# Patient Record
Sex: MALE | Race: WHITE | NOT HISPANIC OR LATINO | URBAN - NONMETROPOLITAN AREA
[De-identification: names, ages, dates, MRNs, and addresses within clinical notes are randomized per-mention and may not be internally consistent; named-entity substitution may affect disease eponyms.]

---

## 2018-10-03 ENCOUNTER — FLU SHOT (OUTPATIENT)
Dept: FAMILY MEDICINE CLINIC | Facility: CLINIC | Age: 43
End: 2018-10-03

## 2018-10-03 DIAGNOSIS — Z23 FLU VACCINE NEED: Primary | ICD-10-CM

## 2018-10-03 PROCEDURE — 90471 IMMUNIZATION ADMIN: CPT | Performed by: FAMILY MEDICINE

## 2018-10-03 PROCEDURE — 90674 CCIIV4 VAC NO PRSV 0.5 ML IM: CPT | Performed by: FAMILY MEDICINE

## 2021-06-27 PROCEDURE — 87635 SARS-COV-2 COVID-19 AMP PRB: CPT | Performed by: NURSE PRACTITIONER

## 2022-11-10 ENCOUNTER — OFFICE VISIT (OUTPATIENT)
Dept: FAMILY MEDICINE CLINIC | Facility: CLINIC | Age: 47
End: 2022-11-10

## 2022-11-10 ENCOUNTER — LAB (OUTPATIENT)
Dept: LAB | Facility: HOSPITAL | Age: 47
End: 2022-11-10

## 2022-11-10 VITALS
DIASTOLIC BLOOD PRESSURE: 80 MMHG | TEMPERATURE: 96.9 F | SYSTOLIC BLOOD PRESSURE: 110 MMHG | OXYGEN SATURATION: 98 % | BODY MASS INDEX: 32.11 KG/M2 | RESPIRATION RATE: 18 BRPM | WEIGHT: 242.3 LBS | HEIGHT: 73 IN | HEART RATE: 93 BPM

## 2022-11-10 DIAGNOSIS — Z12.5 PROSTATE CANCER SCREENING: ICD-10-CM

## 2022-11-10 DIAGNOSIS — R53.83 OTHER FATIGUE: ICD-10-CM

## 2022-11-10 DIAGNOSIS — Z12.11 SCREENING FOR COLON CANCER: ICD-10-CM

## 2022-11-10 DIAGNOSIS — Z80.0 FAMILY HISTORY OF COLON CANCER: ICD-10-CM

## 2022-11-10 DIAGNOSIS — Z11.59 NEED FOR HEPATITIS C SCREENING TEST: ICD-10-CM

## 2022-11-10 DIAGNOSIS — Z76.89 ENCOUNTER TO ESTABLISH CARE: Primary | ICD-10-CM

## 2022-11-10 DIAGNOSIS — Z00.00 ANNUAL PHYSICAL EXAM: ICD-10-CM

## 2022-11-10 LAB
ALBUMIN SERPL-MCNC: 4.6 G/DL (ref 3.5–5.2)
ALBUMIN/GLOB SERPL: 2 G/DL
ALP SERPL-CCNC: 73 U/L (ref 39–117)
ALT SERPL W P-5'-P-CCNC: 54 U/L (ref 1–41)
ANION GAP SERPL CALCULATED.3IONS-SCNC: 11 MMOL/L (ref 5–15)
AST SERPL-CCNC: 36 U/L (ref 1–40)
BILIRUB SERPL-MCNC: 0.8 MG/DL (ref 0–1.2)
BUN SERPL-MCNC: 11 MG/DL (ref 6–20)
BUN/CREAT SERPL: 13.9 (ref 7–25)
CALCIUM SPEC-SCNC: 9.3 MG/DL (ref 8.6–10.5)
CHLORIDE SERPL-SCNC: 103 MMOL/L (ref 98–107)
CO2 SERPL-SCNC: 25 MMOL/L (ref 22–29)
CREAT SERPL-MCNC: 0.79 MG/DL (ref 0.76–1.27)
EGFRCR SERPLBLD CKD-EPI 2021: 111.6 ML/MIN/1.73
GLOBULIN UR ELPH-MCNC: 2.3 GM/DL
GLUCOSE SERPL-MCNC: 315 MG/DL (ref 65–99)
HCV AB SER DONR QL: NORMAL
HOLD SPECIMEN: NORMAL
POTASSIUM SERPL-SCNC: 4.3 MMOL/L (ref 3.5–5.2)
PROT SERPL-MCNC: 6.9 G/DL (ref 6–8.5)
SODIUM SERPL-SCNC: 139 MMOL/L (ref 136–145)

## 2022-11-10 PROCEDURE — 99213 OFFICE O/P EST LOW 20 MIN: CPT | Performed by: NURSE PRACTITIONER

## 2022-11-10 PROCEDURE — 83036 HEMOGLOBIN GLYCOSYLATED A1C: CPT

## 2022-11-10 PROCEDURE — 80061 LIPID PANEL: CPT

## 2022-11-10 PROCEDURE — G0103 PSA SCREENING: HCPCS

## 2022-11-10 PROCEDURE — 86803 HEPATITIS C AB TEST: CPT

## 2022-11-10 PROCEDURE — 80050 GENERAL HEALTH PANEL: CPT

## 2022-11-10 PROCEDURE — 36415 COLL VENOUS BLD VENIPUNCTURE: CPT

## 2022-11-10 NOTE — PROGRESS NOTES
Subjective   Erich Pompa is a 46 y.o. male.     History of Present Illness  CC: Establish care/annual exam- fatigue after eating  Fatigue  This is a new problem. The current episode started more than 1 month ago. The problem occurs intermittently. The problem has been waxing and waning. Associated symptoms include fatigue. Pertinent negatives include no abdominal pain, chest pain, chills, congestion, coughing, diaphoresis, fever, myalgias, nausea, rash, sore throat, vomiting or weakness. The symptoms are aggravated by eating. He has tried eating (lower carb diet) for the symptoms. The treatment provided mild relief.        The following portions of the patient's history were reviewed and updated as appropriate: allergies, current medications, past family history, past medical history, past social history, past surgical history and problem list.    Review of Systems   Constitutional: Positive for fatigue. Negative for activity change, appetite change, chills, diaphoresis, fever, unexpected weight gain and unexpected weight loss.   HENT: Negative for congestion, sore throat, trouble swallowing and voice change.    Eyes: Negative for blurred vision, double vision, photophobia, pain and visual disturbance.   Respiratory: Negative for cough, chest tightness, shortness of breath and wheezing.    Cardiovascular: Negative for chest pain, palpitations and leg swelling.   Gastrointestinal: Negative for abdominal distention, abdominal pain, anal bleeding, blood in stool, constipation, diarrhea, nausea, vomiting, GERD and indigestion.   Endocrine: Negative for cold intolerance, heat intolerance, polydipsia, polyphagia and polyuria.   Genitourinary: Negative for dysuria, hematuria and urgency.   Musculoskeletal: Negative for back pain and myalgias.   Skin: Negative for rash.   Allergic/Immunologic: Negative.    Neurological: Negative for dizziness, syncope, weakness, light-headedness and headache.   Hematological: Negative.     Psychiatric/Behavioral: Negative for depressed mood.       Objective   Physical Exam  Vitals and nursing note reviewed.   Constitutional:       General: He is not in acute distress.     Appearance: Normal appearance. He is well-developed. He is obese. He is not ill-appearing, toxic-appearing or diaphoretic.   HENT:      Head: Normocephalic and atraumatic.      Right Ear: External ear normal.      Left Ear: External ear normal.      Nose: Nose normal.   Eyes:      Conjunctiva/sclera: Conjunctivae normal.      Pupils: Pupils are equal, round, and reactive to light.   Neck:      Thyroid: No thyromegaly.      Vascular: No carotid bruit.      Trachea: No tracheal deviation.   Cardiovascular:      Rate and Rhythm: Normal rate and regular rhythm.      Heart sounds: Normal heart sounds. No murmur heard.    No friction rub. No gallop.   Pulmonary:      Effort: Pulmonary effort is normal. No respiratory distress.      Breath sounds: Normal breath sounds. No stridor. No wheezing, rhonchi or rales.   Abdominal:      General: Bowel sounds are normal. There is no distension.      Palpations: Abdomen is soft. There is no mass.      Tenderness: There is no abdominal tenderness. There is no guarding or rebound.      Hernia: No hernia is present.   Musculoskeletal:         General: No tenderness. Normal range of motion.      Cervical back: Normal range of motion and neck supple.   Lymphadenopathy:      Cervical: No cervical adenopathy.   Skin:     General: Skin is warm and dry.      Coloration: Skin is not pale.      Findings: No erythema or rash.   Neurological:      Mental Status: He is alert and oriented to person, place, and time.      Cranial Nerves: No cranial nerve deficit.      Coordination: Coordination normal.   Psychiatric:         Mood and Affect: Mood normal.         Behavior: Behavior normal.         Thought Content: Thought content normal.         Judgment: Judgment normal.           Assessment & Plan   Diagnoses and  all orders for this visit:    1. Encounter to establish care (Primary)    2. Annual physical exam  -     CBC & Differential; Future  -     Comprehensive Metabolic Panel; Future  -     Hemoglobin A1c; Future  -     Lipid Panel; Future  -     TSH; Future  -     PSA Screen; Future  -     Hepatitis C Antibody; Future, will call with results    3. Prostate cancer screening  -     PSA Screen; Future, will call with results    4. Need for hepatitis C screening test  -     Hepatitis C Antibody; Future, will call with results    5. Screening for colon cancer   -Colonoscopy up-to-date as of 2019 per patient.  Next colonoscopy due 2024.  Patient asymptomatic.  We will continue to monitor.    6. Family history of colon cancer   -Plan of care stated above #5.  We will continue to monitor.    7. Other fatigue  Comments:  after eating    Orders:  -     CBC & Differential; Future  -     Comprehensive Metabolic Panel; Future  -     Hemoglobin A1c; Future  -     TSH; Future, will call with results.  Continue low-carb diet.  We will continue to monitor.    8.  Follow-up in 1 year for routine annual physical or sooner for any acute needs.            This document has been electronically signed by DAVY Ash on November 10, 2022 09:32 CST

## 2022-11-11 DIAGNOSIS — E11.9 TYPE 2 DIABETES MELLITUS WITHOUT COMPLICATION, WITHOUT LONG-TERM CURRENT USE OF INSULIN: Primary | ICD-10-CM

## 2022-11-11 LAB
BASOPHILS # BLD AUTO: 0.05 10*3/MM3 (ref 0–0.2)
BASOPHILS NFR BLD AUTO: 0.9 % (ref 0–1.5)
CHOLEST SERPL-MCNC: 197 MG/DL (ref 0–200)
DEPRECATED RDW RBC AUTO: 40.9 FL (ref 37–54)
EOSINOPHIL # BLD AUTO: 0.12 10*3/MM3 (ref 0–0.4)
EOSINOPHIL NFR BLD AUTO: 2.2 % (ref 0.3–6.2)
ERYTHROCYTE [DISTWIDTH] IN BLOOD BY AUTOMATED COUNT: 12.5 % (ref 12.3–15.4)
HBA1C MFR BLD: 11.1 % (ref 4.8–5.6)
HCT VFR BLD AUTO: 45.5 % (ref 37.5–51)
HDLC SERPL-MCNC: 48 MG/DL (ref 40–60)
HGB BLD-MCNC: 15.6 G/DL (ref 13–17.7)
IMM GRANULOCYTES # BLD AUTO: 0.03 10*3/MM3 (ref 0–0.05)
IMM GRANULOCYTES NFR BLD AUTO: 0.5 % (ref 0–0.5)
LDLC SERPL CALC-MCNC: 128 MG/DL (ref 0–100)
LDLC/HDLC SERPL: 2.62 {RATIO}
LYMPHOCYTES # BLD AUTO: 1.44 10*3/MM3 (ref 0.7–3.1)
LYMPHOCYTES NFR BLD AUTO: 26.1 % (ref 19.6–45.3)
MCH RBC QN AUTO: 30.3 PG (ref 26.6–33)
MCHC RBC AUTO-ENTMCNC: 34.3 G/DL (ref 31.5–35.7)
MCV RBC AUTO: 88.3 FL (ref 79–97)
MONOCYTES # BLD AUTO: 0.35 10*3/MM3 (ref 0.1–0.9)
MONOCYTES NFR BLD AUTO: 6.3 % (ref 5–12)
NEUTROPHILS NFR BLD AUTO: 3.53 10*3/MM3 (ref 1.7–7)
NEUTROPHILS NFR BLD AUTO: 64 % (ref 42.7–76)
NRBC BLD AUTO-RTO: 0 /100 WBC (ref 0–0.2)
PLATELET # BLD AUTO: 194 10*3/MM3 (ref 140–450)
PMV BLD AUTO: 11.8 FL (ref 6–12)
PSA SERPL-MCNC: 0.4 NG/ML (ref 0–4)
RBC # BLD AUTO: 5.15 10*6/MM3 (ref 4.14–5.8)
TRIGL SERPL-MCNC: 116 MG/DL (ref 0–150)
TSH SERPL DL<=0.05 MIU/L-ACNC: 1.37 UIU/ML (ref 0.27–4.2)
VLDLC SERPL-MCNC: 21 MG/DL (ref 5–40)
WBC NRBC COR # BLD: 5.52 10*3/MM3 (ref 3.4–10.8)

## 2022-11-11 RX ORDER — METFORMIN HYDROCHLORIDE 500 MG/1
500 TABLET, EXTENDED RELEASE ORAL 2 TIMES DAILY WITH MEALS
Qty: 180 TABLET | Refills: 3 | Status: SHIPPED | OUTPATIENT
Start: 2022-11-11

## 2022-11-11 RX ORDER — BLOOD-GLUCOSE METER
1 KIT MISCELLANEOUS 2 TIMES DAILY
Qty: 1 EACH | Refills: 0 | Status: SHIPPED | OUTPATIENT
Start: 2022-11-11

## 2022-11-11 NOTE — PROGRESS NOTES
Patient's hemoglobin A1c elevated at diabetic level, 11.1.  I am going to start him on a low-dose of metformin 500 mg twice daily with meals.  Strict diabetic diet.  Monitor glucose twice daily.  Prescription for glucometer sent to pharmacy.  Referral to diabetic educator placed.  I would like to see him in 1 month to see how he is tolerating his medication as further adjustments will likely be needed.  1 month follow-up please

## 2022-11-17 ENCOUNTER — OFFICE VISIT (OUTPATIENT)
Dept: ENDOCRINOLOGY | Facility: CLINIC | Age: 47
End: 2022-11-17

## 2022-11-17 DIAGNOSIS — E11.9 TYPE 2 DIABETES MELLITUS WITHOUT COMPLICATION, WITHOUT LONG-TERM CURRENT USE OF INSULIN: ICD-10-CM

## 2022-11-17 PROCEDURE — 98960 EDU&TRN PT SELF-MGMT NQHP 1: CPT | Performed by: DIETITIAN, REGISTERED

## 2022-11-17 NOTE — PROGRESS NOTES
Erich Pompa is a 46 y.o. male referred for outpatient diabetes education by Norris BHATTI. Patient attended individual education for 1 hour with wife on 11/17/2022. Topics covered included:     1. Healthy Food Choices   i. Provided patient with detailed carbohydrate counting guide.    ii. Instructed patient to eat 45-60 grams of carbohydrate with each meal (3-4 exchange choices) and 15 grams of carb for snacks (1 exchange choices).   iii. Provided patient with list of non-starchy vegetables and foods that are low in carbohydrate for snacks and to incorporate with meals (Planning Healthy Meals Packet).    iv. Choose fruits, vegetables, whole grains, legumes, low-fat milk, fiber-rich foods, minimal saturated fats, and watch cholesterol and sodium intake.    v. Reviewed carbohydrate-containing foods, standard serving sizes, and measuring foods.   vi. Reviewed the difference between simple and complex carbohydrate. Encouraged patient to choose complex carbohydrates more often.   vii. Reviewed label reading. Discussed looking at serving size, total carbohydrates, fiber, saturated fat, sodium. Reviewed  amounts of each to hav per day & per meal.      2. Pathophysiology of Diabetes   i. Explained pre-diabetes and diabetes. Reviewed disease process of type 2 diabetes.    ii. Explained common conditions associated with uncontrolled diabetes (diabetic neuropathy, retinopathy, nephropathy, heart disease, skin infections, and kidney disease)     3. Hyperglycemia/Hypoglycemia   i. Discussed signs, symptoms, and difference between hypo/hyperglycemia - and the proper treatment guidelines for both.    ii. Explained A1C and the average blood sugar that goes along with the A1C level.    iii. Discussed how to check blood sugar. Stated when checking blood sugar to check different times of the day to see if there is a pattern. For example: Check fasting blood glucose in the morning, check before lunch, check 2 hours after a  meal, and at bedtime. Reminded to check blood sugar if ever feels jittery to know if blood sugar is high or low.     Provided patient with Diabetes and You book, and Planning Healthy Meals book. Provided handout of sample menu with carbs listed.      Thank you Norris BHATTI for this referral.      JAVIER Arreola, MARIO, LD

## 2022-12-15 ENCOUNTER — OFFICE VISIT (OUTPATIENT)
Dept: FAMILY MEDICINE CLINIC | Facility: CLINIC | Age: 47
End: 2022-12-15

## 2022-12-15 VITALS
RESPIRATION RATE: 18 BRPM | HEIGHT: 73 IN | SYSTOLIC BLOOD PRESSURE: 118 MMHG | OXYGEN SATURATION: 96 % | TEMPERATURE: 97.3 F | BODY MASS INDEX: 31.6 KG/M2 | DIASTOLIC BLOOD PRESSURE: 86 MMHG | WEIGHT: 238.4 LBS | HEART RATE: 80 BPM

## 2022-12-15 DIAGNOSIS — E78.00 ELEVATED LDL CHOLESTEROL LEVEL: ICD-10-CM

## 2022-12-15 DIAGNOSIS — E11.9 TYPE 2 DIABETES MELLITUS WITHOUT COMPLICATION, WITHOUT LONG-TERM CURRENT USE OF INSULIN: Primary | ICD-10-CM

## 2022-12-15 PROCEDURE — 99214 OFFICE O/P EST MOD 30 MIN: CPT | Performed by: NURSE PRACTITIONER

## 2022-12-15 RX ORDER — PRAVASTATIN SODIUM 20 MG
20 TABLET ORAL NIGHTLY
Qty: 30 TABLET | Refills: 5 | Status: SHIPPED | OUTPATIENT
Start: 2022-12-15

## 2022-12-15 RX ORDER — LANCETS 28 GAUGE
EACH MISCELLANEOUS
COMMUNITY
Start: 2022-11-11 | End: 2023-03-16 | Stop reason: SDUPTHER

## 2022-12-15 NOTE — PROGRESS NOTES
Subjective   Erich Pompa is a 46 y.o. male.     History of Present Illness  CC: Follow-up-diabetes, hyperlipidemia  Diabetes  He has type 2 diabetes mellitus. No MedicAlert identification noted. The initial diagnosis of diabetes was made 1 Months ago. Hypoglycemia symptoms include headaches. Pertinent negatives for hypoglycemia include no confusion, dizziness, hunger, mood changes, nervousness/anxiousness, pallor, seizures, sleepiness, speech difficulty, sweats or tremors. Associated symptoms include foot paresthesias. Pertinent negatives for diabetes include no blurred vision, no chest pain, no fatigue, no foot ulcerations, no polydipsia, no polyphagia, no polyuria, no visual change, no weakness and no weight loss. Pertinent negatives for hypoglycemia complications include no blackouts, no hospitalization, no nocturnal hypoglycemia, no required assistance and no required glucagon injection. Symptoms are improving. Diabetic complications include impotence and PVD. Pertinent negatives for diabetic complications include no CVA, heart disease, nephropathy, peripheral neuropathy or retinopathy. Risk factors for coronary artery disease include family history, obesity and sedentary lifestyle. Current diabetic treatment includes diet and oral agent (monotherapy). He is compliant with treatment all of the time. He is currently taking insulin pre-breakfast and at bedtime. His weight is stable. He is following a diabetic and low fiber diet. Meal planning includes avoidance of concentrated sweets and carbohydrate counting. He has had a previous visit with a dietitian. He participates in exercise daily. He monitors blood glucose at home 1-2 x per day. He monitors urine at home <1 x per month. Blood glucose monitoring compliance is excellent. His home blood glucose trend is fluctuating minimally. His breakfast blood glucose is taken after 10 am. His breakfast blood glucose range is generally 140-180 mg/dl. His dinner blood  glucose is taken after 8 pm. His dinner blood glucose range is generally 140-180 mg/dl. His overall blood glucose range is 140-180 mg/dl. He does not see a podiatrist.Eye exam is current.   Hyperlipidemia  This is a new problem. The current episode started more than 1 year ago. Recent lipid tests were reviewed and are variable. Exacerbating diseases include diabetes and obesity. Factors aggravating his hyperlipidemia include fatty foods. Pertinent negatives include no chest pain, focal weakness, leg pain, myalgias or shortness of breath. Current antihyperlipidemic treatment includes statins. There are no compliance problems.  Risk factors for coronary artery disease include family history, dyslipidemia, diabetes mellitus, male sex and obesity.        The following portions of the patient's history were reviewed and updated as appropriate: allergies, current medications, past family history, past medical history, past social history, past surgical history and problem list.    Review of Systems   Constitutional: Negative for activity change, appetite change, chills, fatigue, fever, unexpected weight gain and unexpected weight loss.   HENT: Negative for congestion, sore throat, trouble swallowing and voice change.    Eyes: Negative.  Negative for blurred vision.   Respiratory: Negative for cough, chest tightness, shortness of breath and wheezing.    Cardiovascular: Negative for chest pain, palpitations and leg swelling.   Gastrointestinal: Negative for abdominal pain, diarrhea, nausea and vomiting.   Endocrine: Negative.  Negative for cold intolerance, heat intolerance, polydipsia, polyphagia and polyuria.   Genitourinary: Positive for impotence. Negative for dysuria, hematuria and urgency.   Musculoskeletal: Negative for arthralgias and myalgias.   Skin: Negative for pallor and rash.   Neurological: Negative for dizziness, tremors, focal weakness, seizures, speech difficulty, weakness, light-headedness, headache and  confusion.   Hematological: Negative.    Psychiatric/Behavioral: The patient is not nervous/anxious.        Objective   Physical Exam  Vitals and nursing note reviewed.   Constitutional:       General: He is not in acute distress.     Appearance: Normal appearance. He is well-developed. He is obese. He is not ill-appearing, toxic-appearing or diaphoretic.   HENT:      Head: Normocephalic and atraumatic.   Eyes:      Conjunctiva/sclera: Conjunctivae normal.   Cardiovascular:      Rate and Rhythm: Normal rate and regular rhythm.      Heart sounds: Normal heart sounds. No murmur heard.    No friction rub. No gallop.   Pulmonary:      Effort: Pulmonary effort is normal. No respiratory distress.      Breath sounds: Normal breath sounds. No stridor. No wheezing, rhonchi or rales.   Abdominal:      General: Bowel sounds are normal. There is no distension.      Palpations: Abdomen is soft. There is no mass.      Tenderness: There is no abdominal tenderness. There is no guarding or rebound.      Hernia: No hernia is present.   Musculoskeletal:         General: No tenderness. Normal range of motion.      Cervical back: Normal range of motion.   Skin:     General: Skin is warm and dry.      Coloration: Skin is not pale.      Findings: No erythema or rash.   Neurological:      Mental Status: He is alert and oriented to person, place, and time.   Psychiatric:         Mood and Affect: Mood normal.         Behavior: Behavior normal.         Thought Content: Thought content normal.         Judgment: Judgment normal.           Assessment & Plan   Diagnoses and all orders for this visit:    1. Type 2 diabetes mellitus without complication, without long-term current use of insulin (HCC) (Primary)  -     empagliflozin (Jardiance) 10 MG tablet tablet; Take 1 tablet by mouth Daily.  Dispense: 30 tablet; Refill: 5   -Patient has seen diabetic educator and has started to make dietary changes.  Tolerating metformin well with no adverse GI  effects reported.  Continue metformin and strict diabetic diet.  We will add Jardiance 10 mg daily.  Current glucose levels averaging between 130 and 180 since changing diet and starting metformin.  Spent approximately 20 minutes with patient discussing diabetic management, diet, monitoring.  All questions answered to patient's satisfaction.  Patient verbalized understanding of instruction.  We will continue to monitor.    2. Elevated LDL cholesterol level  -     pravastatin (PRAVACHOL) 20 MG tablet; Take 1 tablet by mouth Every Night.  Dispense: 30 tablet; Refill: 5   - Mildly elevated LDL above goal.  We will start pravastatin 20 mg daily.  Low-fat diet.  We will continue to monitor.    3.  Follow-up in 3 months or sooner for any acute needs.            This document has been electronically signed by DAVY Ash on December 15, 2022 13:24 CST

## 2022-12-29 DIAGNOSIS — Z12.11 SCREENING FOR COLON CANCER: Primary | ICD-10-CM

## 2023-01-11 ENCOUNTER — PREP FOR SURGERY (OUTPATIENT)
Dept: OTHER | Facility: HOSPITAL | Age: 48
End: 2023-01-11
Payer: COMMERCIAL

## 2023-01-11 DIAGNOSIS — Z80.0 FAMILY HISTORY OF MALIGNANT NEOPLASM OF GASTROINTESTINAL TRACT: ICD-10-CM

## 2023-01-11 DIAGNOSIS — Z12.11 ENCOUNTER FOR SCREENING FOR MALIGNANT NEOPLASM OF COLON: Primary | ICD-10-CM

## 2023-01-11 RX ORDER — DEXTROSE AND SODIUM CHLORIDE 5; .45 G/100ML; G/100ML
30 INJECTION, SOLUTION INTRAVENOUS CONTINUOUS PRN
Status: CANCELLED | OUTPATIENT
Start: 2023-02-09

## 2023-01-11 RX ORDER — SODIUM CHLORIDE 9 MG/ML
40 INJECTION, SOLUTION INTRAVENOUS AS NEEDED
Status: CANCELLED | OUTPATIENT
Start: 2023-02-09

## 2023-02-09 ENCOUNTER — HOSPITAL ENCOUNTER (OUTPATIENT)
Facility: HOSPITAL | Age: 48
Setting detail: HOSPITAL OUTPATIENT SURGERY
Discharge: HOME | End: 2023-02-09
Attending: INTERNAL MEDICINE | Admitting: INTERNAL MEDICINE
Payer: COMMERCIAL

## 2023-02-09 ENCOUNTER — ANESTHESIA EVENT (OUTPATIENT)
Dept: GASTROENTEROLOGY | Facility: HOSPITAL | Age: 48
End: 2023-02-09
Payer: COMMERCIAL

## 2023-02-09 ENCOUNTER — ANESTHESIA (OUTPATIENT)
Dept: GASTROENTEROLOGY | Facility: HOSPITAL | Age: 48
End: 2023-02-09
Payer: COMMERCIAL

## 2023-02-09 VITALS
WEIGHT: 221 LBS | BODY MASS INDEX: 29.29 KG/M2 | RESPIRATION RATE: 16 BRPM | HEART RATE: 95 BPM | HEIGHT: 73 IN | TEMPERATURE: 97.7 F | DIASTOLIC BLOOD PRESSURE: 67 MMHG | OXYGEN SATURATION: 97 % | SYSTOLIC BLOOD PRESSURE: 110 MMHG

## 2023-02-09 DIAGNOSIS — Z12.11 ENCOUNTER FOR SCREENING FOR MALIGNANT NEOPLASM OF COLON: ICD-10-CM

## 2023-02-09 DIAGNOSIS — Z80.0 FAMILY HISTORY OF MALIGNANT NEOPLASM OF GASTROINTESTINAL TRACT: ICD-10-CM

## 2023-02-09 PROCEDURE — 25010000002 PROPOFOL 10 MG/ML EMULSION

## 2023-02-09 RX ORDER — DEXTROSE AND SODIUM CHLORIDE 5; .45 G/100ML; G/100ML
30 INJECTION, SOLUTION INTRAVENOUS CONTINUOUS PRN
Status: DISCONTINUED | OUTPATIENT
Start: 2023-02-09 | End: 2023-02-09 | Stop reason: HOSPADM

## 2023-02-09 RX ORDER — SODIUM CHLORIDE 9 MG/ML
40 INJECTION, SOLUTION INTRAVENOUS AS NEEDED
Status: DISCONTINUED | OUTPATIENT
Start: 2023-02-09 | End: 2023-02-09 | Stop reason: HOSPADM

## 2023-02-09 RX ORDER — LIDOCAINE HYDROCHLORIDE 20 MG/ML
INJECTION, SOLUTION INTRAVENOUS AS NEEDED
Status: DISCONTINUED | OUTPATIENT
Start: 2023-02-09 | End: 2023-02-09 | Stop reason: SURG

## 2023-02-09 RX ORDER — PROPOFOL 10 MG/ML
VIAL (ML) INTRAVENOUS AS NEEDED
Status: DISCONTINUED | OUTPATIENT
Start: 2023-02-09 | End: 2023-02-09 | Stop reason: SURG

## 2023-02-09 RX ADMIN — PROPOFOL 90 MG: 10 INJECTION, EMULSION INTRAVENOUS at 09:39

## 2023-02-09 RX ADMIN — PROPOFOL 40 MG: 10 INJECTION, EMULSION INTRAVENOUS at 09:38

## 2023-02-09 RX ADMIN — PROPOFOL 70 MG: 10 INJECTION, EMULSION INTRAVENOUS at 09:37

## 2023-02-09 RX ADMIN — LIDOCAINE HYDROCHLORIDE 50 MG: 20 INJECTION, SOLUTION INTRAVENOUS at 09:37

## 2023-02-09 RX ADMIN — DEXTROSE AND SODIUM CHLORIDE 30 ML/HR: 5; 450 INJECTION, SOLUTION INTRAVENOUS at 08:51

## 2023-02-09 NOTE — H&P
Concetta Pan DO,[facility]  Gastroenterology  Hepatology  Endoscopy  Board Certified in Internal Medicine and gastroenterology  [address], [address]  [place]. [address]  - [phone]   F - [phone]     GASTROENTEROLOGY HISTORY AND PHYSICAL  NOTE   CONCETTA PAN DO.         SUBJECTIVE:   2/9/2023    Name: Concetta Pompa  DOD: 1975        Chief Complaint:     Subjective : Personal history of colon polyps    Patient is 47 y.o. male presents with desire for elective colonoscopy.      ROS/HISTORY/ CURRENT MEDICATIONS/OBJECTIVE/VS/PE:   Review of Systems:  All systems unremarkable unless specified below.  Constitutional   HENT  Eyes   Respiratory    Cardiovascular  Gastrointestinal   Endocrine  Genitourinary    Musculoskeletal   Skin  Allergic/Immunologic    Neurological    Hematological  Psychiatric/Behavioral    History:     Past Medical History:   Diagnosis Date   • Diabetes mellitus (HCC)    • Elevated cholesterol    • Low back pain      Past Surgical History:   Procedure Laterality Date   • COLONOSCOPY       Family History   Problem Relation Age of Onset   • Diabetes Mother    • Hypertension Mother    • Diabetes Father    • Heart disease Father    • Hypertension Father    • Cancer Sister    • Colon cancer Sister    • No Known Problems Sister    • No Known Problems Sister    • No Known Problems Daughter    • No Known Problems Son    • No Known Problems Maternal Grandmother    • No Known Problems Maternal Grandfather    • No Known Problems Paternal Grandmother    • No Known Problems Paternal Grandfather      Social History     Tobacco Use   • Smoking status: Former     Types: Cigarettes     Quit date: 2020     Years since quitting: 3.1   • Smokeless tobacco: Never   Vaping Use   • Vaping Use: Every day   • Substances: Nicotine, Flavoring   • Devices: Disposable   Substance Use Topics   • Alcohol use: Yes     Comment: yearly   • Drug use: Never     Prior to Admission medications     Medication Sig Start Date End Date Taking? Authorizing Provider   empagliflozin (Jardiance) 10 MG tablet tablet Take 1 tablet by mouth Daily. 12/15/22  Yes Norris Nettles APRN   glucose blood test strip 1 each by Other route 2 (Two) Times a Day. Use as instructed 11/11/22  Yes Norris Nettles APRN   glucose monitor monitoring kit 1 each 2 (Two) Times a Day. 11/11/22  Yes Norris Nettles APRN   Lancet Devices misc 1 each 2 (Two) Times a Day. 11/11/22  Yes Norris Nettles APRN   Lancets (freestyle) lancets  11/11/22  Yes Provider, MD Ulysses   metFORMIN ER (GLUCOPHAGE-XR) 500 MG 24 hr tablet Take 1 tablet by mouth 2 (Two) Times a Day With Meals. 11/11/22  Yes Norris Nettles APRN   pravastatin (PRAVACHOL) 20 MG tablet Take 1 tablet by mouth Every Night. 12/15/22  Yes Norris Nettles APRN     Allergies:  Patient has no known allergies.    I have reviewed the patients medical history, surgical history and family history in the available medical record system.     Current Medications:     Current Facility-Administered Medications   Medication Dose Route Frequency Provider Last Rate Last Admin   • dextrose 5 % and sodium chloride 0.45 % infusion  30 mL/hr Intravenous Continuous PRN Erich Hermosillo DO 30 mL/hr at 02/09/23 0851 30 mL/hr at 02/09/23 0851   • sodium chloride 0.9 % infusion 40 mL  40 mL Intravenous PRN Erich Hermosillo DO           Objective     Physical Exam:   Temp:  [97.4 °F (36.3 °C)] 97.4 °F (36.3 °C)  Heart Rate:  [106] 106  Resp:  [18] 18  BP: (111)/(72) 111/72    Physical Exam:  General Appearance:    Alert, cooperative, in no acute distress   Head:    Normocephalic, without obvious abnormality, atraumatic   Eyes:            Lids and lashes normal, conjunctivae and sclerae normal, no icterus, no pallor, corneas clear, PERRLA   Ears:    Ears appear intact with no abnormalities noted   Throat:   No oral lesions, no thrush, oral mucosa moist   Neck:   No adenopathy, supple,  trachea midline, no thyromegaly, no  carotid bruit, no JVD   Back:     No kyphosis present, no scoliosis present, no skin lesions,   erythema or scars, no tenderness to percussion or                 palpation,  range of motion normal   Lungs:     Clear to auscultation,respirations regular, even and         unlabored    Heart:    Regular rhythm and normal rate, normal S1 and S2, no  murmur, no gallop, no rub, no click   Breast Exam:    Deferred   Abdomen:     Normal bowel sounds, no masses, no organomegaly, soft  nontender, nondistended, no guarding, no rebound                 tenderness   Genitalia:    Deferred   Extremities:   Moves all extremities well, no edema, no cyanosis, no          redness   Pulses:   Pulses palpable and equal bilaterally   Skin:   No bleeding, bruising or rash   Lymph nodes:   No palpable adenopathy   Neurologic:   Cranial nerves 2 - 12 grossly intact, sensation intact, DTR     present and equal bilaterally      Results Review:     Lab Results   Component Value Date    WBC 5.52 11/10/2022    HGB 15.6 11/10/2022    HCT 45.5 11/10/2022     11/10/2022             No results found for: LIPASE  No results found for: INR  No results found for: THROATCX    Radiology Review:  Imaging Results (Last 72 Hours)     ** No results found for the last 72 hours. **           I reviewed the patient's new clinical results.  I reviewed the patient's new imaging results and agree with the interpretation.     ASSESSMENT/PLAN:   ASSESSMENT:  1.  Personal history of colon polyps    PLAN:  1.  Colonoscopy    Risk and benefits associated with the procedure are reviewed with the patient.  The patient wished to proceed     Erich Hermosillo DO  02/09/23  09:29 CST

## 2023-02-09 NOTE — ANESTHESIA PREPROCEDURE EVALUATION
Anesthesia Evaluation     Patient summary reviewed and Nursing notes reviewed   NPO Solid Status: > 8 hours  NPO Liquid Status: > 2 hours           Airway   Mallampati: II  TM distance: >3 FB  Neck ROM: full  No difficulty expected  Dental - normal exam     Pulmonary - negative pulmonary ROS and normal exam   Cardiovascular - normal exam  Exercise tolerance: excellent (>7 METS)    (+) hyperlipidemia,       Neuro/Psych- negative ROS  GI/Hepatic/Renal/Endo    (+)   diabetes mellitus type 2 well controlled,     Musculoskeletal (-) negative ROS    Abdominal  - normal exam   Substance History - negative use     OB/GYN negative ob/gyn ROS         Other                        Anesthesia Plan    ASA 2     general     intravenous induction     Anesthetic plan, risks, benefits, and alternatives have been provided, discussed and informed consent has been obtained with: patient.        CODE STATUS:

## 2023-02-09 NOTE — ANESTHESIA POSTPROCEDURE EVALUATION
Patient: Erich Pompa    Procedure Summary     Date: 02/09/23 Room / Location: [facility] ENDOSCOPY 2 / [facility] ENDOSCOPY    Anesthesia Start: 0937 Anesthesia Stop: 0950    Procedure: COLONOSCOPY 9:30 Diagnosis:       Encounter for screening for malignant neoplasm of colon      Family history of malignant neoplasm of gastrointestinal tract      (Encounter for screening for malignant neoplasm of colon [Z12.11])      (Family history of malignant neoplasm of gastrointestinal tract [Z80.0])    Surgeons: Erich Hermosillo DO Provider: Verónica Pastrana CRNA    Anesthesia Type: general ASA Status: 2          Anesthesia Type: general    Vitals  No vitals data found for the desired time range.          Post Anesthesia Care and Evaluation    Patient location during evaluation: PHASE II  Patient participation: complete - patient cannot participate  Level of consciousness: sleepy but conscious  Pain score: 0  Pain management: adequate    Airway patency: patent  Anesthetic complications: No anesthetic complications  PONV Status: none  Cardiovascular status: acceptable  Respiratory status: acceptable  Hydration status: acceptable  No anesthesia care post op

## 2023-03-16 ENCOUNTER — OFFICE VISIT (OUTPATIENT)
Dept: FAMILY MEDICINE CLINIC | Facility: CLINIC | Age: 48
End: 2023-03-16
Payer: COMMERCIAL

## 2023-03-16 ENCOUNTER — LAB (OUTPATIENT)
Dept: LAB | Facility: HOSPITAL | Age: 48
End: 2023-03-16
Payer: COMMERCIAL

## 2023-03-16 VITALS
DIASTOLIC BLOOD PRESSURE: 72 MMHG | TEMPERATURE: 98 F | BODY MASS INDEX: 29.06 KG/M2 | HEART RATE: 83 BPM | WEIGHT: 219.3 LBS | HEIGHT: 73 IN | RESPIRATION RATE: 18 BRPM | SYSTOLIC BLOOD PRESSURE: 104 MMHG | OXYGEN SATURATION: 98 %

## 2023-03-16 DIAGNOSIS — E78.00 ELEVATED LDL CHOLESTEROL LEVEL: ICD-10-CM

## 2023-03-16 DIAGNOSIS — N52.9 ERECTILE DYSFUNCTION, UNSPECIFIED ERECTILE DYSFUNCTION TYPE: ICD-10-CM

## 2023-03-16 DIAGNOSIS — E11.9 TYPE 2 DIABETES MELLITUS WITHOUT COMPLICATION, WITHOUT LONG-TERM CURRENT USE OF INSULIN: Primary | ICD-10-CM

## 2023-03-16 DIAGNOSIS — E11.9 TYPE 2 DIABETES MELLITUS WITHOUT COMPLICATION, WITHOUT LONG-TERM CURRENT USE OF INSULIN: ICD-10-CM

## 2023-03-16 PROBLEM — Z80.0 FAMILY HISTORY OF MALIGNANT NEOPLASM OF COLON: Status: ACTIVE | Noted: 2017-09-19

## 2023-03-16 LAB
ALBUMIN UR-MCNC: <1.2 MG/DL
ANION GAP SERPL CALCULATED.3IONS-SCNC: 12.7 MMOL/L (ref 5–15)
BUN SERPL-MCNC: 18 MG/DL (ref 6–20)
BUN/CREAT SERPL: 21.4 (ref 7–25)
CALCIUM SPEC-SCNC: 10.6 MG/DL (ref 8.6–10.5)
CHLORIDE SERPL-SCNC: 105 MMOL/L (ref 98–107)
CHOLEST SERPL-MCNC: 170 MG/DL (ref 0–200)
CO2 SERPL-SCNC: 26.3 MMOL/L (ref 22–29)
CREAT SERPL-MCNC: 0.84 MG/DL (ref 0.76–1.27)
CREAT UR-MCNC: 72.8 MG/DL
EGFRCR SERPLBLD CKD-EPI 2021: 108.9 ML/MIN/1.73
GLUCOSE SERPL-MCNC: 133 MG/DL (ref 65–99)
HBA1C MFR BLD: 6 % (ref 4.8–5.6)
HDLC SERPL-MCNC: 54 MG/DL (ref 40–60)
LDLC SERPL CALC-MCNC: 96 MG/DL (ref 0–100)
LDLC/HDLC SERPL: 1.74 {RATIO}
MICROALBUMIN/CREAT UR: NORMAL MG/G{CREAT}
POTASSIUM SERPL-SCNC: 4.8 MMOL/L (ref 3.5–5.2)
SODIUM SERPL-SCNC: 144 MMOL/L (ref 136–145)
TRIGL SERPL-MCNC: 109 MG/DL (ref 0–150)
VLDLC SERPL-MCNC: 20 MG/DL (ref 5–40)

## 2023-03-16 PROCEDURE — 82570 ASSAY OF URINE CREATININE: CPT

## 2023-03-16 PROCEDURE — 99214 OFFICE O/P EST MOD 30 MIN: CPT | Performed by: NURSE PRACTITIONER

## 2023-03-16 PROCEDURE — 83036 HEMOGLOBIN GLYCOSYLATED A1C: CPT

## 2023-03-16 PROCEDURE — 80061 LIPID PANEL: CPT

## 2023-03-16 PROCEDURE — 36415 COLL VENOUS BLD VENIPUNCTURE: CPT

## 2023-03-16 PROCEDURE — 80048 BASIC METABOLIC PNL TOTAL CA: CPT

## 2023-03-16 PROCEDURE — 82043 UR ALBUMIN QUANTITATIVE: CPT

## 2023-03-16 RX ORDER — SILDENAFIL CITRATE 20 MG/1
TABLET ORAL
Qty: 30 TABLET | Refills: 5 | Status: SHIPPED | OUTPATIENT
Start: 2023-03-16

## 2023-03-16 NOTE — PROGRESS NOTES
Subjective   Erich Pompa is a 47 y.o. male.     History of Present Illness  CC: Diabetes, hyperlipidemia, ED  Diabetes  He has type 2 diabetes mellitus. No MedicAlert identification noted. The initial diagnosis of diabetes was made 4 Months ago. Pertinent negatives for hypoglycemia include no confusion, dizziness, headaches, hunger, mood changes, nervousness/anxiousness, pallor, seizures, sleepiness, speech difficulty, sweats or tremors. Pertinent negatives for diabetes include no blurred vision, no chest pain, no fatigue, no foot paresthesias, no foot ulcerations, no polydipsia, no polyphagia, no polyuria, no visual change, no weakness and no weight loss. Pertinent negatives for hypoglycemia complications include no blackouts, no hospitalization, no nocturnal hypoglycemia, no required assistance and no required glucagon injection. Symptoms are improving. Diabetic complications include impotence. Pertinent negatives for diabetic complications include no autonomic neuropathy, CVA, heart disease, nephropathy, peripheral neuropathy, PVD or retinopathy. Risk factors for coronary artery disease include family history, obesity and tobacco exposure. Current diabetic treatment includes diet and oral agent (monotherapy). He is compliant with treatment all of the time. His weight is decreasing steadily. He is following a diabetic diet. Meal planning includes avoidance of concentrated sweets. He has had a previous visit with a dietitian. He participates in exercise daily. He monitors blood glucose at home 1-2 x per day. Blood glucose monitoring compliance is excellent. His home blood glucose trend is decreasing steadily. His breakfast blood glucose is taken between 7-8 am. His dinner blood glucose is taken between 7-8 pm. His highest blood glucose is 110-130 mg/dl. His overall blood glucose range is 110-130 mg/dl. He does not see a podiatrist.Eye exam is current.   Hyperlipidemia  This is a new problem. The current  episode started more than 1 month ago. Recent lipid tests were reviewed and are variable. Exacerbating diseases include diabetes. He has no history of obesity. Factors aggravating his hyperlipidemia include fatty foods. Pertinent negatives include no chest pain, focal sensory loss, focal weakness, leg pain, myalgias or shortness of breath. Current antihyperlipidemic treatment includes statins, exercise and diet change. The current treatment provides significant improvement of lipids. There are no compliance problems.  Risk factors for coronary artery disease include family history, dyslipidemia, male sex and a sedentary lifestyle.   Erectile Dysfunction  This is a new problem. The current episode started more than 1 month ago. The problem is unchanged. The nature of his difficulty is achieving erection, maintaining erection and penetration. He reports no anxiety, decreased libido or performance anxiety. He reports his erection duration to be 5 to 10 minutes. Irritative symptoms do not include frequency, nocturia or urgency. Obstructive symptoms do not include dribbling, incomplete emptying, an intermittent stream, a slower stream, straining or a weak stream. Pertinent negatives include no chills, dysuria, genital pain, hematuria, hesitancy or inability to urinate. The symptoms are aggravated by stress. Past treatments include nothing. The treatment provided no relief. Risk factors include diabetes mellitus.        The following portions of the patient's history were reviewed and updated as appropriate: allergies, current medications, past family history, past medical history, past social history, past surgical history and problem list.    Review of Systems   Constitutional: Negative for activity change, appetite change, chills, fatigue, fever, unexpected weight gain and unexpected weight loss.   HENT: Negative for congestion, sore throat, trouble swallowing and voice change.    Eyes: Negative.  Negative for blurred  vision.   Respiratory: Negative for cough, chest tightness, shortness of breath and wheezing.    Cardiovascular: Negative for chest pain, palpitations and leg swelling.   Gastrointestinal: Negative for abdominal pain, diarrhea, nausea and vomiting.   Endocrine: Negative.  Negative for cold intolerance, heat intolerance, polydipsia, polyphagia and polyuria.   Genitourinary: Positive for impotence and erectile dysfunction. Negative for decreased libido, dysuria, frequency, hematuria, hesitancy, incomplete emptying, nocturia and urgency.   Musculoskeletal: Negative for arthralgias and myalgias.   Skin: Negative for pallor and rash.   Neurological: Negative for dizziness, tremors, focal weakness, seizures, speech difficulty, weakness, light-headedness, headache and confusion.   Hematological: Negative.    Psychiatric/Behavioral: Negative.  The patient is not nervous/anxious.        Objective   Physical Exam  Vitals and nursing note reviewed.   Constitutional:       General: He is not in acute distress.     Appearance: Normal appearance. He is well-developed and normal weight. He is not ill-appearing, toxic-appearing or diaphoretic.   HENT:      Head: Normocephalic and atraumatic.   Eyes:      Conjunctiva/sclera: Conjunctivae normal.   Cardiovascular:      Rate and Rhythm: Normal rate and regular rhythm.      Heart sounds: Normal heart sounds. No murmur heard.    No friction rub. No gallop.   Pulmonary:      Effort: Pulmonary effort is normal. No respiratory distress.      Breath sounds: Normal breath sounds. No stridor. No wheezing, rhonchi or rales.   Abdominal:      General: Bowel sounds are normal. There is no distension.      Palpations: Abdomen is soft. There is no mass.      Tenderness: There is no abdominal tenderness. There is no guarding or rebound.      Hernia: No hernia is present.   Musculoskeletal:         General: No tenderness. Normal range of motion.      Cervical back: Normal range of motion.   Skin:      General: Skin is warm and dry.      Coloration: Skin is not pale.      Findings: No erythema or rash.   Neurological:      Mental Status: He is alert and oriented to person, place, and time.   Psychiatric:         Mood and Affect: Mood normal.         Behavior: Behavior normal.         Thought Content: Thought content normal.         Judgment: Judgment normal.           Assessment & Plan   Diagnoses and all orders for this visit:    1. Type 2 diabetes mellitus without complication, without long-term current use of insulin (HCC) (Primary)  -     Basic Metabolic Panel; Future  -     Hemoglobin A1c; Future  -     Lipid Panel; Future  -     Microalbumin / Creatinine Urine Ratio - Urine, Clean Catch; Future   - Glucose levels well controlled with no hyper hypoglycemia reported.  Tolerating metformin and Jardiance well.  Continue diabetic diet, Jardiance, metformin.  We will call with lab results.  We will continue to monitor.    2. Elevated LDL cholesterol level  -     Basic Metabolic Panel; Future  -     Hemoglobin A1c; Future  -     Lipid Panel; Future, will call with lab results.  Tolerating statin well.  Continue pravastatin and low-fat diet.  We will continue to monitor.    3. Erectile dysfunction, unspecified erectile dysfunction type  -     sildenafil (REVATIO) 20 MG tablet; Use 2-5 tablets as needed for erectile dysfunction. Do not exceed 100mg in 24 hours.  Dispense: 30 tablet; Refill: 5   - Patient has no cardiovascular history and denies chest pain, shortness of breath or palpitations.  He is agreeable to a trial of sildenafil 2 to 5 tablets with no more than 5 tablets in 24 hours for treatment of his ED.  Instructed patient to immediately discontinue medication and present to the ER should he develop chest pain, shortness of breath, palpitations or an erection that lasts longer than 4 hours.  Patient verbalized understanding of instruction and agrees with this plan of care.    4.  Follow-up in 3 months or  sooner for any acute needs.            This document has been electronically signed by DAVY Ash on March 16, 2023 18:16 CDT

## 2023-03-17 NOTE — PROGRESS NOTES
Hemoglobin A1c has improved to 6.0.  Continue diabetic diet, medications and routine exercise.  Calcium slightly elevated.  We will repeat BMP at follow-up.  Follow-up as scheduled.

## 2023-06-10 DIAGNOSIS — E11.9 TYPE 2 DIABETES MELLITUS WITHOUT COMPLICATION, WITHOUT LONG-TERM CURRENT USE OF INSULIN: ICD-10-CM

## 2023-06-10 DIAGNOSIS — E78.00 ELEVATED LDL CHOLESTEROL LEVEL: ICD-10-CM

## 2023-06-12 DIAGNOSIS — E78.00 ELEVATED LDL CHOLESTEROL LEVEL: ICD-10-CM

## 2023-06-12 DIAGNOSIS — E11.9 TYPE 2 DIABETES MELLITUS WITHOUT COMPLICATION, WITHOUT LONG-TERM CURRENT USE OF INSULIN: ICD-10-CM

## 2023-06-12 RX ORDER — PRAVASTATIN SODIUM 20 MG
TABLET ORAL
Qty: 90 TABLET | Refills: 0 | Status: SHIPPED | OUTPATIENT
Start: 2023-06-12

## 2023-06-12 RX ORDER — PRAVASTATIN SODIUM 20 MG
20 TABLET ORAL NIGHTLY
Qty: 30 TABLET | Refills: 5 | Status: CANCELLED | OUTPATIENT
Start: 2023-06-12

## 2023-06-12 RX ORDER — EMPAGLIFLOZIN 10 MG/1
TABLET, FILM COATED ORAL
Qty: 30 TABLET | Refills: 5 | Status: SHIPPED | OUTPATIENT
Start: 2023-06-12

## 2023-06-12 NOTE — TELEPHONE ENCOUNTER
Incoming Refill Request      Medication requested (name and dose): empagliflozin (Jardiance) 10 MG tablet tablet   pravastatin (PRAVACHOL) 20 MG tablet     Pharmacy where request should be sent: [facility]    Additional details provided by patient: Pt is completely out of medication    Best call back number: [phone]    Does the patient have less than a 3 day supply:  [x] Yes  [] No    Nimesh Herbert Rep  06/12/23, 15:53 CDT

## 2023-07-12 ENCOUNTER — APPOINTMENT (OUTPATIENT)
Dept: PHYSICAL THERAPY | Facility: HOSPITAL | Age: 48
End: 2023-07-12
Payer: COMMERCIAL

## 2023-07-14 ENCOUNTER — APPOINTMENT (OUTPATIENT)
Dept: PHYSICAL THERAPY | Facility: HOSPITAL | Age: 48
End: 2023-07-14
Payer: COMMERCIAL

## 2023-07-27 ENCOUNTER — HOSPITAL ENCOUNTER (OUTPATIENT)
Dept: PHYSICAL THERAPY | Facility: HOSPITAL | Age: 48
Setting detail: THERAPIES SERIES
Discharge: HOME | End: 2023-07-27
Payer: COMMERCIAL

## 2023-07-27 DIAGNOSIS — M25.511 ACUTE PAIN OF RIGHT SHOULDER: Primary | ICD-10-CM

## 2023-07-27 PROCEDURE — 97140 MANUAL THERAPY 1/> REGIONS: CPT

## 2023-07-27 PROCEDURE — 97110 THERAPEUTIC EXERCISES: CPT

## 2023-07-28 NOTE — THERAPY PROGRESS REPORT/RE-CERT
Outpatient Physical Therapy Ortho Progress Note  [facility]     Patient Name: Erich Pompa  : 1975  MRN: [id number]  Today's Date: 2023      Visit Date: 2023    Visit Dx:    ICD-10-CM ICD-9-CM   1. Acute pain of right shoulder  M25.511 719.41       Patient Active Problem List   Diagnosis    Type 2 diabetes mellitus without complication, without long-term current use of insulin    Encounter for screening for malignant neoplasm of colon    Family history of malignant neoplasm of gastrointestinal tract    Family history of malignant neoplasm of colon        Past Medical History:   Diagnosis Date    Diabetes mellitus     Elevated cholesterol     Low back pain         Past Surgical History:   Procedure Laterality Date    COLONOSCOPY      COLONOSCOPY N/A 2023    Procedure: COLONOSCOPY 9:30;  Surgeon: Erich Hermosillo DO;  Location: [facility] ENDOSCOPY;  Service: Gastroenterology;  Laterality: N/A;        PT Ortho       Row Name 23 1600       Subjective Comments    Subjective Comments Pt notes he had dog pull on leash during walk this week which increased his shoulder pain, has not been quite as diligent about his exercises this week per report. subjective improvment 50%.  -AC       Precautions and Contraindications    Precautions/Limitations no known precautions/limitations  -AC       Subjective Pain    Able to rate subjective pain? yes  -AC    Pre-Treatment Pain Level 1  -AC       Right Upper Ext    Rt Shoulder Abduction AROM 133  -AC    Rt Shoulder Flexion AROM 136  -AC    Rt Shoulder External Rotation AROM 67  -AC    Rt Shoulder Internal Rotation AROM 43  -AC    Rt Upper Extremity Comments  taken in standing  -AC       MMT Right Upper Ext    Rt Shoulder Flexion MMT, Gross Movement (4+/5) good plus  -AC    Rt Shoulder Extension MMT, Gross Movement (5/5) normal  -AC    Rt Shoulder ABduction MMT, Gross Movement (4+/5) good plus  -AC    Rt Shoulder ADduction MMT, Gross Movement  (5/5) normal  -AC    Rt Shoulder Internal Rotation MMT, Gross Movement (4+/5) good plus  -AC    Rt Shoulder External Rotation MMT, Gross Movement (4+/5) good plus  -AC              User Key  (r) = Recorded By, (t) = Taken By, (c) = Cosigned By      Initials Name Provider Type     Sol Hightower, PT Physical Therapist                                 PT Assessment/Plan       Row Name 07/27/23 1600          PT Assessment    Functional Limitations Limitations in functional capacity and performance;Performance in leisure activities;Performance in self-care ADL;Performance in work activities  -     Impairments Impaired flexibility;Muscle strength;Pain;Posture;Range of motion  -     Assessment Comments re-evaluation completed today. 1 goal met for ER > 50 deg and 1 partially met for minimal TTP. pt demos minimal TTP throguout session today however it comes and goes at this time based on activity level between sessions. HE continues with slight decreased in overall shoulder mobility and all end ranges. good tolerance to post/inf shoulder mobilizations which does improve ROM and decrease pain with flex/abduction ROM today. He continues with decreased scapular strength to maintain correct posture. He will continue to benefit from skilled PT to improve towards remaining goals.  -AC     Rehab Potential Good  -AC     Patient/caregiver participated in establishment of treatment plan and goals Yes  -AC     Patient would benefit from skilled therapy intervention Yes  -AC        PT Plan    PT Frequency 1x/week  -AC     Predicted Duration of Therapy Intervention (PT) 3-4 more weeks  -AC     PT Plan Comments continue with manual for joint and muscle mobility and progress scap strength as tolerated.  -AC               User Key  (r) = Recorded By, (t) = Taken By, (c) = Cosigned By      Initials Name Provider Type    Sol Whiting, PT Physical Therapist                       OP Exercises       Row Name 07/27/23 1700  07/27/23 1600          Subjective Comments    Subjective Comments -- Pt notes he had dog pull on leash during walk this week which increased his shoulder pain, has not been quite as diligent about his exercises this week per report. subjective improvment 50%.  -AC        Subjective Pain    Able to rate subjective pain? -- yes  -AC     Pre-Treatment Pain Level -- 1  -AC        Total Minutes    01950 - PT Therapeutic Exercise Minutes 35  -AC --     72722 - PT Manual Therapy Minutes 15  -AC --        Exercise 1    Exercise Name 1 -- Pro II- L4  -AC     Time 1 -- 3/3  -AC        Exercise 2    Exercise Name 2 -- row machine  -AC     Time 2 -- 5 min  -AC        Exercise 3    Exercise Name 3 -- BOSU dome down plank  -AC     Sets 3 -- 3  -AC     Time 3 -- 15s  -AC        Exercise 4    Exercise Name 4 -- tball ITY  -AC     Sets 4 -- 1  -AC     Reps 4 -- 10  -AC     Additional Comments -- 4 with 3# DBs then no weight to finish exercise  -AC        Exercise 5    Exercise Name 5 -- Cybex 360   -AC     Sets 5 -- 2  -AC     Reps 5 -- 10  -AC     Additional Comments -- 2 plates  -AC        Exercise 6    Exercise Name 6 -- cybex 360- D1  -AC     Sets 6 -- 2  -AC     Reps 6 -- 10  -AC        Exercise 7    Exercise Name 7 -- shoulder flexion table stretch  -AC     Sets 7 -- 1  -AC     Reps 7 -- 10  -AC     Time 7 -- 10s hold  -AC        Exercise 8    Exercise Name 8 -- ROM/MMT- see ortho  -AC        Exercise 9    Exercise Name 9 -- see manual  -AC               User Key  (r) = Recorded By, (t) = Taken By, (c) = Cosigned By      Initials Name Provider Type    AC Sol Hightower, PT Physical Therapist                             Manual Rx (last 36 hours)       Manual Treatments       Row Name 07/28/23 0500 07/27/23 1700          Total Minutes    26642 - PT Manual Therapy Minutes -- 15  -AC        Manual Rx 1    Manual Rx 1 Location PROM shoulder flex, abd, ER/IR  -AC --     Manual Rx 1 Type PROM  -AC --     Manual Rx 1  Duration 8 min  -AC --        Manual Rx 2    Manual Rx 2 Location R shoulder  -AC --     Manual Rx 2 Type post/inf joint mobs  -AC --     Manual Rx 2 Duration 5 min  -AC --               User Key  (r) = Recorded By, (t) = Taken By, (c) = Cosigned By      Initials Name Provider Type    AC Sol Hightower, PT Physical Therapist                     PT OP Goals       Row Name 07/27/23 1700          PT Short Term Goals    STG Date to Achieve 07/27/23  -AC     STG 1 Pt is indpt with HEP.  -AC     STG 1 Progress Ongoing  -AC     STG 2 Pt demo's AROM R shoulder abduction to 140 deg.  -AC     STG 2 Progress Not Met  -AC     STG 2 Progress Comments 133  -AC     STG 3 Pt demo's R shoulder ER to 50 deg or better.  -AC     STG 3 Progress Met  -AC     STG 4 Pt demo's minimal trigger point tenderness throughout shoulder/scap/cervical musculature.  -AC     STG 4 Progress Partially Met;Ongoing  -AC        Long Term Goals    LTG Date to Achieve 08/10/23  -AC     LTG 1 R shoulder AROM WNLs all planes.  -AC     LTG 1 Progress Ongoing  -AC     LTG 2 R shoulder MMT 5/5 all palnes.  -AC     LTG 2 Progress Ongoing  -AC     LTG 3 Pt able to return to normal workouts with minimal pain increase.  -AC     LTG 3 Progress Ongoing  -AC     LTG 4 Subjective improvment 80% or better.  -AC     LTG 4 Progress Not Met  -AC     LTG 4 Progress Comments 50%  -AC        Time Calculation    PT Goal Re-Cert Due Date 08/17/23  -AC               User Key  (r) = Recorded By, (t) = Taken By, (c) = Cosigned By      Initials Name Provider Type    Sol Whiting, PT Physical Therapist                                   Time Calculation:   Start Time: 1645  Stop Time: 1735  Time Calculation (min): 50 min  Timed Charges  90129 - PT Therapeutic Exercise Minutes: 35  03984 - PT Manual Therapy Minutes: 15  Total Minutes  Timed Charges Total Minutes: 50   Total Minutes: 50                Sol Hightower PT  7/28/2023

## 2023-08-03 ENCOUNTER — HOSPITAL ENCOUNTER (OUTPATIENT)
Dept: PHYSICAL THERAPY | Facility: HOSPITAL | Age: 48
Setting detail: THERAPIES SERIES
Discharge: HOME | End: 2023-08-03
Payer: COMMERCIAL

## 2023-08-03 PROCEDURE — 97140 MANUAL THERAPY 1/> REGIONS: CPT

## 2023-08-03 PROCEDURE — 97110 THERAPEUTIC EXERCISES: CPT

## 2023-08-10 ENCOUNTER — HOSPITAL ENCOUNTER (OUTPATIENT)
Dept: PHYSICAL THERAPY | Facility: HOSPITAL | Age: 48
Setting detail: THERAPIES SERIES
Discharge: HOME | End: 2023-08-10
Payer: COMMERCIAL

## 2023-08-10 DIAGNOSIS — M25.511 ACUTE PAIN OF RIGHT SHOULDER: Primary | ICD-10-CM

## 2023-08-10 PROCEDURE — 97110 THERAPEUTIC EXERCISES: CPT

## 2023-08-10 PROCEDURE — 97140 MANUAL THERAPY 1/> REGIONS: CPT

## 2023-08-10 NOTE — THERAPY TREATMENT NOTE
Outpatient Physical Therapy Ortho Treatment Note  [facility]     Patient Name: Erich Pompa  : 1975  MRN: [id number]  Today's Date: 8/10/2023      Visit Date: 08/10/2023    Visit Dx:    ICD-10-CM ICD-9-CM   1. Acute pain of right shoulder  M25.511 719.41       Patient Active Problem List   Diagnosis    Type 2 diabetes mellitus without complication, without long-term current use of insulin    Encounter for screening for malignant neoplasm of colon    Family history of malignant neoplasm of gastrointestinal tract    Family history of malignant neoplasm of colon        Past Medical History:   Diagnosis Date    Diabetes mellitus     Elevated cholesterol     Low back pain         Past Surgical History:   Procedure Laterality Date    COLONOSCOPY      COLONOSCOPY N/A 2023    Procedure: COLONOSCOPY 9:30;  Surgeon: Erich Hermosillo DO;  Location: [facility] ENDOSCOPY;  Service: Gastroenterology;  Laterality: N/A;        PT Ortho       Row Name 08/10/23 0900       Subjective Comments    Subjective Comments Pt notes doing well today, no c/o pain. has been working on ROM with pulley's a lot this week.  -AC       Precautions and Contraindications    Precautions/Limitations no known precautions/limitations  -AC       Subjective Pain    Able to rate subjective pain? yes  -AC    Pre-Treatment Pain Level 0  -AC              User Key  (r) = Recorded By, (t) = Taken By, (c) = Cosigned By      Initials Name Provider Type    AC Sol Hightower, PT Physical Therapist                                 PT Assessment/Plan       Row Name 08/10/23 0900          PT Assessment    Assessment Comments treatment tolerated well continued slight decrease in ROM at end range flex/abd. visible improvements in ROM following manual IR/ER PROM today. TTP throughout infraspinatus and teres major today. added ER AROm/stretch to HEP, and reinfored abduction with pulleys. continues to progress well.  -AC     Rehab Potential Good   -AC     Patient/caregiver participated in establishment of treatment plan and goals Yes  -AC     Patient would benefit from skilled therapy intervention Yes  -AC        PT Plan    PT Frequency 1x/week  -AC     Predicted Duration of Therapy Intervention (PT) 3-4 more weeks  -AC     PT Plan Comments continue with POC  -AC               User Key  (r) = Recorded By, (t) = Taken By, (c) = Cosigned By      Initials Name Provider Type    AC Sol Hightower, PT Physical Therapist                       OP Exercises       Row Name 08/10/23 0900             Subjective Comments    Subjective Comments Pt notes doing well today, no c/o pain. has been working on ROM with pulley's a lot this week.  -AC         Subjective Pain    Able to rate subjective pain? yes  -AC      Pre-Treatment Pain Level 0  -AC         Total Minutes    50767 - PT Therapeutic Exercise Minutes 28  -AC      33967 - PT Manual Therapy Minutes 15  -AC         Exercise 1    Exercise Name 1 Row machine  -AC      Time 1 5 min  -AC         Exercise 2    Exercise Name 2 see manual  -AC      Time 2 15 min  -AC         Exercise 3    Exercise Name 3 scap punches  -AC      Sets 3 1  -AC      Reps 3 20  -AC      Additional Comments 3# DB  -AC         Exercise 4    Exercise Name 4 body blade: 0 abd/add, 90 abd/add, 90 flex/ext, and IR/ER  -AC      Sets 4 2 each  -AC      Time 4 30s  -AC         Exercise 5    Exercise Name 5 doorway ER stretch  -AC      Sets 5 3  -AC      Time 5 30s  -AC                User Key  (r) = Recorded By, (t) = Taken By, (c) = Cosigned By      Initials Name Provider Type    AC Sol Hightower, PT Physical Therapist                             Manual Rx (last 36 hours)       Manual Treatments       Row Name 08/10/23 0900             Total Minutes    68688 - PT Manual Therapy Minutes 15  -AC         Manual Rx 1    Manual Rx 1 Location PROM: flex, abd, IR/ER  -AC      Manual Rx 1 Duration 6 min  -AC         Manual Rx 2    Manual Rx 2 Location R  shoulder post/inf mobs  -AC      Manual Rx 2 Duration 4 min  -AC         Manual Rx 3    Manual Rx 3 Location R infraspinatus and teres major/minor  -AC      Manual Rx 3 Type manual trigger point release  -AC      Manual Rx 3 Duration 5 min  -AC                User Key  (r) = Recorded By, (t) = Taken By, (c) = Cosigned By      Initials Name Provider Type    Sol Whiting, PT Physical Therapist                     PT OP Goals       Row Name 08/10/23 0900          PT Short Term Goals    STG Date to Achieve 07/27/23  -AC     STG 1 Pt is indpt with HEP.  -AC     STG 1 Progress Ongoing  -AC     STG 2 Pt demo's AROM R shoulder abduction to 140 deg.  -AC     STG 2 Progress Not Met  -AC     STG 3 Pt demo's R shoulder ER to 50 deg or better.  -AC     STG 3 Progress Met  -AC     STG 4 Pt demo's minimal trigger point tenderness throughout shoulder/scap/cervical musculature.  -AC     STG 4 Progress Partially Met;Ongoing  -AC        Long Term Goals    LTG Date to Achieve 08/10/23  -AC     LTG 1 R shoulder AROM WNLs all planes.  -AC     LTG 1 Progress Ongoing  -AC     LTG 2 R shoulder MMT 5/5 all palnes.  -AC     LTG 2 Progress Ongoing  -AC     LTG 3 Pt able to return to normal workouts with minimal pain increase.  -AC     LTG 3 Progress Ongoing  -AC     LTG 4 Subjective improvment 80% or better.  -AC     LTG 4 Progress Not Met  -AC        Time Calculation    PT Goal Re-Cert Due Date 08/17/23  -               User Key  (r) = Recorded By, (t) = Taken By, (c) = Cosigned By      Initials Name Provider Type    Sol Whiting, PT Physical Therapist                                   Time Calculation:   Start Time: 0847  Stop Time: 0930  Time Calculation (min): 43 min  Timed Charges  98120 - PT Therapeutic Exercise Minutes: 28  34842 - PT Manual Therapy Minutes: 15  Total Minutes  Timed Charges Total Minutes: 43   Total Minutes: 43  Therapy Charges for Today       Code Description Service Date Service Provider Modifiers  Qty    [id number]  PT THER PROC EA 15 MIN 8/10/2023 Sol Hightower, PT GP 2    [id number] HC PT MANUAL THERAPY EA 15 MIN 8/10/2023 Sol Hightower, PT GP 1                      Sol Hightower, PT  8/10/2023

## 2023-08-17 ENCOUNTER — HOSPITAL ENCOUNTER (OUTPATIENT)
Dept: PHYSICAL THERAPY | Facility: HOSPITAL | Age: 48
Setting detail: THERAPIES SERIES
Discharge: HOME | End: 2023-08-17
Payer: COMMERCIAL

## 2023-08-17 DIAGNOSIS — M25.511 ACUTE PAIN OF RIGHT SHOULDER: Primary | ICD-10-CM

## 2023-08-17 PROCEDURE — 97140 MANUAL THERAPY 1/> REGIONS: CPT

## 2023-08-17 PROCEDURE — 97110 THERAPEUTIC EXERCISES: CPT

## 2023-08-17 NOTE — THERAPY TREATMENT NOTE
Outpatient Physical Therapy Ortho Treatment Note  [facility]     Patient Name: Erich Pompa  : 1975  MRN: [id number]  Today's Date: 2023      Visit Date: 2023    Visit Dx:    ICD-10-CM ICD-9-CM   1. Acute pain of right shoulder  M25.511 719.41       Patient Active Problem List   Diagnosis    Type 2 diabetes mellitus without complication, without long-term current use of insulin    Encounter for screening for malignant neoplasm of colon    Family history of malignant neoplasm of gastrointestinal tract    Family history of malignant neoplasm of colon        Past Medical History:   Diagnosis Date    Diabetes mellitus     Elevated cholesterol     Low back pain         Past Surgical History:   Procedure Laterality Date    COLONOSCOPY      COLONOSCOPY N/A 2023    Procedure: COLONOSCOPY 9:30;  Surgeon: Erich Hermosillo DO;  Location: [facility] ENDOSCOPY;  Service: Gastroenterology;  Laterality: N/A;        PT Ortho       Row Name 23 0800       Precautions and Contraindications    Precautions/Limitations no known precautions/limitations  -AC       Subjective Pain    Able to rate subjective pain? yes  -AC    Pre-Treatment Pain Level 0  -AC              User Key  (r) = Recorded By, (t) = Taken By, (c) = Cosigned By      Initials Name Provider Type    AC Sol Hightower, PT Physical Therapist                                 PT Assessment/Plan       Row Name 23 0900          PT Assessment    Assessment Comments treatment tolerated well with increased foucs on scap/shoulder strengthening without compensation. pt continues to preferance shoulder elevation/protraction. with tactile cuing able to maintain a better posture with activities  -AC     Rehab Potential Good  -AC     Patient/caregiver participated in establishment of treatment plan and goals Yes  -AC     Patient would benefit from skilled therapy intervention Yes  -AC        PT Plan    PT Frequency 1x/week  -AC      Predicted Duration of Therapy Intervention (PT) 3-4 weeks  -AC     PT Plan Comments continue with POC  -AC               User Key  (r) = Recorded By, (t) = Taken By, (c) = Cosigned By      Initials Name Provider Type    Sol Whiting PT Physical Therapist                       OP Exercises       Row Name 08/17/23 0800             Subjective Comments    Subjective Comments Pt notes slight increase in muscle soreness/pain last 2-3 days, possibly due to overdoing it with tband exercises. reports no new changes/concerns  -AC         Subjective Pain    Able to rate subjective pain? yes  -AC      Pre-Treatment Pain Level 0  -AC         Total Minutes    10564 - PT Therapeutic Exercise Minutes 30  -AC      10295 - PT Manual Therapy Minutes 15  -AC         Exercise 1    Exercise Name 1 Pro II- L5  -AC      Time 1 5/5  -AC         Exercise 2    Exercise Name 2 see manual  -AC      Time 2 15 min  -AC         Exercise 3    Exercise Name 3 ZULY scap pro/ret  -AC      Sets 3 1  -AC      Reps 3 20  -AC         Exercise 4    Exercise Name 4 prone: shoulder flex with lat row  -AC      Sets 4 1  -AC      Reps 4 15  -AC         Exercise 5    Exercise Name 5 cybex row  -AC      Sets 5 2  -AC      Reps 5 15  -AC      Additional Comments 50#  -AC         Exercise 6    Exercise Name 6 cybex lat pulls  -AC      Sets 6 2  -AC      Reps 6 15  -AC      Additional Comments 50#  -AC         Exercise 7    Exercise Name 7 shoulder flexion at plynth  -AC      Sets 7 1  -AC      Reps 7 10  -AC      Time 7 10s hold  -AC      Additional Comments tactile for scap alignment throughout to decrease ant shoulder pinch  -AC                User Key  (r) = Recorded By, (t) = Taken By, (c) = Cosigned By      Initials Name Provider Type    Sol Whiting PT Physical Therapist                             Manual Rx (last 36 hours)       Manual Treatments       Row Name 08/17/23 0900 08/17/23 0800          Total Minutes    39029 - PT Manual  Therapy Minutes -- 15  -AC        Manual Rx 1    Manual Rx 1 Location PROM- flex/abd/IR/ER  -AC --     Manual Rx 1 Duration 5 min  -AC --        Manual Rx 2    Manual Rx 2 Location R infraspinatus, brenda, pec major/minor  -AC --     Manual Rx 2 Type STM/TPR  -AC --     Manual Rx 2 Duration 10 min  -AC --               User Key  (r) = Recorded By, (t) = Taken By, (c) = Cosigned By      Initials Name Provider Type    Sol Whiting, PT Physical Therapist                     PT OP Goals       Row Name 08/17/23 0800          PT Short Term Goals    STG Date to Achieve 07/27/23  -AC     STG 1 Pt is indpt with HEP.  -AC     STG 1 Progress Ongoing  -AC     STG 2 Pt demo's AROM R shoulder abduction to 140 deg.  -AC     STG 2 Progress Not Met  -AC     STG 3 Pt demo's R shoulder ER to 50 deg or better.  -AC     STG 3 Progress Met  -AC     STG 4 Pt demo's minimal trigger point tenderness throughout shoulder/scap/cervical musculature.  -AC     STG 4 Progress Partially Met;Ongoing  -AC        Long Term Goals    LTG Date to Achieve 08/10/23  -AC     LTG 1 R shoulder AROM WNLs all planes.  -AC     LTG 1 Progress Ongoing  -AC     LTG 2 R shoulder MMT 5/5 all palnes.  -AC     LTG 2 Progress Ongoing  -AC     LTG 3 Pt able to return to normal workouts with minimal pain increase.  -AC     LTG 3 Progress Ongoing  -AC     LTG 4 Subjective improvment 80% or better.  -AC     LTG 4 Progress Not Met  -        Time Calculation    PT Goal Re-Cert Due Date 08/17/23  -               User Key  (r) = Recorded By, (t) = Taken By, (c) = Cosigned By      Initials Name Provider Type    Sol Whiting, PT Physical Therapist                                   Time Calculation:   Start Time: 0847  Stop Time: 0932  Time Calculation (min): 45 min  Timed Charges  98299 - PT Therapeutic Exercise Minutes: 30  85770 - PT Manual Therapy Minutes: 15  Total Minutes  Timed Charges Total Minutes: 45   Total Minutes: 45  Therapy Charges for Today        Code Description Service Date Service Provider Modifiers Qty    43550530921  PT THER PROC EA 15 MIN 8/17/2023 Sol Hightower, PT GP 2    [id number]  PT MANUAL THERAPY EA 15 MIN 8/17/2023 Sol Hightower, PT GP 1                      Sol Hightower, PT  8/17/2023

## 2023-08-24 ENCOUNTER — HOSPITAL ENCOUNTER (OUTPATIENT)
Dept: PHYSICAL THERAPY | Facility: HOSPITAL | Age: 48
Setting detail: THERAPIES SERIES
Discharge: HOME | End: 2023-08-24
Payer: COMMERCIAL

## 2023-08-24 DIAGNOSIS — M25.511 ACUTE PAIN OF RIGHT SHOULDER: Primary | ICD-10-CM

## 2023-08-24 PROCEDURE — 97110 THERAPEUTIC EXERCISES: CPT

## 2023-08-24 PROCEDURE — 97140 MANUAL THERAPY 1/> REGIONS: CPT

## 2023-08-24 NOTE — THERAPY PROGRESS REPORT/RE-CERT
"  Outpatient Physical Therapy Ortho Progress Note  [facility]     Patient Name: Erich Pompa  : 1975  MRN: [id number]  Today's Date: 2023      Visit Date: 2023    ATTENDANCE:   SUBJECTIVE IMPROVEMENT: \"getting better\"   NEXT MD APPOINTMENT: TBD  RECERT DATE: 23    THERAPY DIAGNOSIS: R shoulder pain       Visit Dx:    ICD-10-CM ICD-9-CM   1. Acute pain of right shoulder  M25.511 719.41       Patient Active Problem List   Diagnosis    Type 2 diabetes mellitus without complication, without long-term current use of insulin    Encounter for screening for malignant neoplasm of colon    Family history of malignant neoplasm of gastrointestinal tract    Family history of malignant neoplasm of colon        Past Medical History:   Diagnosis Date    Diabetes mellitus     Elevated cholesterol     Low back pain         Past Surgical History:   Procedure Laterality Date    COLONOSCOPY      COLONOSCOPY N/A 2023    Procedure: COLONOSCOPY 9:30;  Surgeon: Erich Hermosillo DO;  Location: [facility] ENDOSCOPY;  Service: Gastroenterology;  Laterality: N/A;        PT Ortho       Row Name 23 0900       Shoulder Girdle Palpation    Infraspinatus Right:;Tender  -AC    Teres Minor Right:;Tender  -AC    Pect Minor Right:;Tender  -AC    Upper Trap Right:;Tender;Guarded/taut  -AC       Right Upper Ext    Rt Shoulder Abduction AROM 172  -AC    Rt Shoulder Abduction PROM 180  -AC    Rt Shoulder Flexion AROM 155  -AC    Rt Shoulder Flexion PROM 160  -AC    Rt Shoulder External Rotation AROM 65  -AC    Rt Shoulder External Rotation PROM 70  -AC    Rt Shoulder Internal Rotation AROM 66  -AC    Rt Shoulder Internal Rotation PROM 70  -AC       MMT Right Upper Ext    Rt Shoulder Flexion MMT, Gross Movement (5/5) normal  -AC    Rt Shoulder Extension MMT, Gross Movement (5/5) normal  -AC    Rt Shoulder ABduction MMT, Gross Movement (4+/5) good plus  -AC    Rt Shoulder ADduction MMT, Gross Movement (5/5) " normal  -AC    Rt Shoulder Internal Rotation MMT, Gross Movement (5/5) normal  -AC    Rt Shoulder External Rotation MMT, Gross Movement (4+/5) good plus  -AC      Row Name 08/24/23 0800       Subjective Comments    Subjective Comments Pt notes signficiant improvments since last week. reports that he has not had pinching type pain over the past week and has really only felt discomfort when completing stretches/exercises at home.  -AC       Precautions and Contraindications    Precautions/Limitations no known precautions/limitations  -AC       Subjective Pain    Able to rate subjective pain? yes  -AC    Pre-Treatment Pain Level 0  -AC              User Key  (r) = Recorded By, (t) = Taken By, (c) = Cosigned By      Initials Name Provider Type    AC Sol Hightower, PT Physical Therapist                                 PT Assessment/Plan       Row Name 08/24/23 0900          PT Assessment    Functional Limitations Limitations in functional capacity and performance;Performance in leisure activities;Performance in self-care ADL;Performance in work activities  -     Impairments Impaired flexibility;Muscle strength;Pain;Posture;Range of motion  -     Assessment Comments re-evaluaiton completed todya. signficiant improvment iwht shoulder mobility in all planes today. mild pain with shoulder flexion and ER with end range ROM. He continues with decreased scap stability however with verbal/tactle cuing able to maintan neutral alignment with ther-ex and improving self-correction of posture throughout sessesion evident. He demo's signficnat decrease in pain and slowly improve strenght/shoulder stability- continues to remain approrpaite for skilled PT to improve overall shoulder strrenght, mobility, and scap stability to continue to ensure decreased pain and return to PLOF.  -AC     Rehab Potential Good  -AC     Patient/caregiver participated in establishment of treatment plan and goals Yes  -AC     Patient would benefit  from skilled therapy intervention Yes  -AC        PT Plan    PT Frequency 1x/week  -AC     Predicted Duration of Therapy Intervention (PT) 2-4 weeks  -AC     PT Plan Comments continue to progress scap and shoulder strength/stability increase HEP as needed. if no pain continues d/c to HEP in next 2-4 weeks.  -AC               User Key  (r) = Recorded By, (t) = Taken By, (c) = Cosigned By      Initials Name Provider Type    AC Sol Hightower, PT Physical Therapist                       OP Exercises       Row Name 08/24/23 0900 08/24/23 0800          Subjective Comments    Subjective Comments -- Pt notes signficiant improvments since last week. reports that he has not had pinching type pain over the past week and has really only felt discomfort when completing stretches/exercises at home.  -AC        Subjective Pain    Able to rate subjective pain? -- yes  -AC     Pre-Treatment Pain Level -- 0  -AC        Total Minutes    38790 - PT Therapeutic Exercise Minutes 36  -AC --     23249 - PT Manual Therapy Minutes 10  -AC --        Exercise 1    Exercise Name 1 -- Row machine  -AC     Time 1 -- 5 min  -AC        Exercise 2    Exercise Name 2 -- see manual  -AC     Time 2 -- 10 min  -AC        Exercise 3    Exercise Name 3 -- ROM/MMT- see ortho  -AC        Exercise 4    Exercise Name 4 -- open books  -AC     Sets 4 -- 1  -AC     Reps 4 -- 10  -AC     Time 4 -- 10s hold  -AC        Exercise 5    Exercise Name 5 -- 1/2 kneeling t-spine rotation with lateral flexion  -AC     Reps 5 -- 5x each side  -AC        Exercise 6    Exercise Name 6 -- scap clocks on wall with RTB  -AC     Sets 6 -- 1  -AC     Reps 6 -- 10  -AC     Additional Comments -- R only- 12, 1,3, 4  -AC        Exercise 7    Exercise Name 7 -- shoulder abduction  -AC     Sets 7 -- 2  -AC     Reps 7 -- 10  -AC     Additional Comments -- GTB  -AC        Exercise 8    Exercise Name 8 -- shoulder ER  -AC     Sets 8 -- 2  -AC     Reps 8 -- 10  -AC     Additional  Comments -- GTB- towel roll hold for form  -AC               User Key  (r) = Recorded By, (t) = Taken By, (c) = Cosigned By      Initials Name Provider Type    AC Sol Hightower, PT Physical Therapist                             Manual Rx (last 36 hours)       Manual Treatments       Row Name 08/24/23 0900             Total Minutes    25912 - PT Manual Therapy Minutes 10  -AC         Manual Rx 1    Manual Rx 1 Location R: infraspinaus, teres rhett/min, pec rhett/min, UT  -AC      Manual Rx 1 Duration 10 min  -                User Key  (r) = Recorded By, (t) = Taken By, (c) = Cosigned By      Initials Name Provider Type    AC Sol Hightower, PT Physical Therapist                     PT OP Goals       Row Name 08/24/23 0900          PT Short Term Goals    STG Date to Achieve 07/27/23  -     STG 1 Pt is indpt with HEP.  -AC     STG 1 Progress Ongoing  -AC     STG 2 Pt demo's AROM R shoulder abduction to 140 deg.  -AC     STG 2 Progress Met  -AC     STG 3 Pt demo's R shoulder ER to 50 deg or better.  -AC     STG 3 Progress Met  -AC     STG 4 Pt demo's minimal trigger point tenderness throughout shoulder/scap/cervical musculature.  -AC     STG 4 Progress Met;Ongoing  -AC        Long Term Goals    LTG Date to Achieve 08/10/23  -AC     LTG 1 R shoulder AROM WNLs all planes.  -AC     LTG 1 Progress Partially Met;Ongoing  -AC     LTG 1 Progress Comments mildly limited ER and shoulder flexion  -AC     LTG 2 R shoulder MMT 5/5 all palnes.  -AC     LTG 2 Progress Partially Met;Ongoing  -AC     LTG 2 Progress Comments 4+ shoulder abd and ER  -AC     LTG 3 Pt able to return to normal workouts with minimal pain increase.  -AC     LTG 3 Progress Ongoing  -AC     LTG 4 Subjective improvment 80% or better.  -     LTG 4 Progress Not Met  -        Time Calculation    PT Goal Re-Cert Due Date 09/14/23  -               User Key  (r) = Recorded By, (t) = Taken By, (c) = Cosigned By      Initials Name Provider Type    AC  Sol Hightower, PT Physical Therapist                    Therapy Education  Education Details: scap clocks and t-spine rotatoin  Given: HEP  Program: New  How Provided: Verbal, Demonstration, Written  Provided to: Patient  Level of Understanding: Verbalized, Demonstrated              Time Calculation:   Start Time: 0844  Stop Time: 0930  Time Calculation (min): 46 min  Timed Charges  48406 - PT Therapeutic Exercise Minutes: 36  70778 - PT Manual Therapy Minutes: 10  Total Minutes  Timed Charges Total Minutes: 46   Total Minutes: 46  Therapy Charges for Today       Code Description Service Date Service Provider Modifiers Qty    [id number]  PT THER PROC EA 15 MIN 8/24/2023 Sol Hightower, PT GP 2    [id number]  PT MANUAL THERAPY EA 15 MIN 8/24/2023 Sol Hightower, PT GP 1                      Sol Hightower, PT  8/24/2023

## 2023-08-31 ENCOUNTER — HOSPITAL ENCOUNTER (OUTPATIENT)
Dept: PHYSICAL THERAPY | Facility: HOSPITAL | Age: 48
Setting detail: THERAPIES SERIES
Discharge: HOME | End: 2023-08-31
Payer: COMMERCIAL

## 2023-08-31 DIAGNOSIS — M25.511 ACUTE PAIN OF RIGHT SHOULDER: Primary | ICD-10-CM

## 2023-08-31 PROCEDURE — 97110 THERAPEUTIC EXERCISES: CPT

## 2023-08-31 PROCEDURE — 97140 MANUAL THERAPY 1/> REGIONS: CPT

## 2023-08-31 NOTE — THERAPY TREATMENT NOTE
Outpatient Physical Therapy Ortho Treatment Note  [facility]     Patient Name: Erich Pompa  : 1975  MRN: [id number]  Today's Date: 2023      Visit Date: 2023    ATTENDANCE: 10/10  SUBJECTIVE IMPROVEMENT: not assessed this date  NEXT MD APPOINTMENT: TBD  RECERT DATE: 23    THERAPY DIAGNOSIS: R shoulder pain       Visit Dx:    ICD-10-CM ICD-9-CM   1. Acute pain of right shoulder  M25.511 719.41       Patient Active Problem List   Diagnosis    Type 2 diabetes mellitus without complication, without long-term current use of insulin    Encounter for screening for malignant neoplasm of colon    Family history of malignant neoplasm of gastrointestinal tract    Family history of malignant neoplasm of colon        Past Medical History:   Diagnosis Date    Diabetes mellitus     Elevated cholesterol     Low back pain         Past Surgical History:   Procedure Laterality Date    COLONOSCOPY      COLONOSCOPY N/A 2023    Procedure: COLONOSCOPY 9:30;  Surgeon: Erich Hermosillo DO;  Location: [facility] ENDOSCOPY;  Service: Gastroenterology;  Laterality: N/A;        PT Ortho       Row Name 23 0900       Subjective Comments    Subjective Comments Pt notes no pain over the past week, slight decreased complaince to stretching compared to strengthening HEP however still doing something most day.  -AC       Precautions and Contraindications    Precautions/Limitations no known precautions/limitations  -AC       Subjective Pain    Able to rate subjective pain? yes  -AC    Pre-Treatment Pain Level 0  -AC              User Key  (r) = Recorded By, (t) = Taken By, (c) = Cosigned By      Initials Name Provider Type    AC Sol Hightower, PT Physical Therapist                                 PT Assessment/Plan       Row Name 23 1000          PT Assessment    Assessment Comments treatment tolerated well. slight improvment in standing shoulder IR behind back following manual this date. =He  continues with poor scap control with strengthening. improve ROM with tactile cuing for scap in/out rotaiton ty.  -AC     Rehab Potential Good  -AC     Patient/caregiver participated in establishment of treatment plan and goals Yes  -AC     Patient would benefit from skilled therapy intervention Yes  -AC        PT Plan    PT Frequency 1x/week  -AC     Predicted Duration of Therapy Intervention (PT) 2-4 weeks  -AC     PT Plan Comments continue with POC  -AC               User Key  (r) = Recorded By, (t) = Taken By, (c) = Cosigned By      Initials Name Provider Type    AC Sol Hightower, PT Physical Therapist                       OP Exercises       Row Name 08/31/23 1000 08/31/23 0900          Subjective Comments    Subjective Comments -- Pt notes no pain over the past week, slight decreased complaince to stretching compared to strengthening HEP however still doing something most day.  -AC        Subjective Pain    Able to rate subjective pain? -- yes  -AC     Pre-Treatment Pain Level -- 0  -AC        Total Minutes    74978 - PT Therapeutic Exercise Minutes 33  -AC --     30041 - PT Manual Therapy Minutes 12  -AC --        Exercise 1    Exercise Name 1 -- Row machine  -AC     Time 1 -- 6 min  -AC        Exercise 2    Exercise Name 2 -- Cybex lat pulls  -AC     Sets 2 -- 3  -AC     Reps 2 -- 10 @ 50#  -AC     Additional Comments -- pt notes slight increase in pulling type pain at biceps/lateral delt- bicep stretch complete  -AC        Exercise 3    Exercise Name 3 -- cybex rows  -AC     Sets 3 -- 3  -AC     Reps 3 -- 10  -AC     Additional Comments -- 50#  -AC        Exercise 4    Exercise Name 4 -- cybex shoulder press  -AC     Sets 4 -- 2  -AC     Reps 4 -- 10  -AC     Additional Comments -- 15#  -AC        Exercise 5    Exercise Name 5 -- doorway pec stretch  -AC     Sets 5 -- 3  -AC     Reps 5 -- --  -AC     Time 5 -- 30s  -AC     Additional Comments -- arms high  -AC        Exercise 6    Exercise Name  6 -- bicep stretch  -AC     Sets 6 -- 3  -AC     Time 6 -- 30s  -AC     Additional Comments -- R only  -AC        Exercise 7    Exercise Name 7 -- lat pulls with YTB- behind head  -AC     Sets 7 -- 2  -AC     Reps 7 -- 5  -AC     Additional Comments -- pec stretch between sets to imporve mobility  -AC        Exercise 8    Exercise Name 8 -- see manual  -AC     Time 8 -- 12 min  -AC               User Key  (r) = Recorded By, (t) = Taken By, (c) = Cosigned By      Initials Name Provider Type     Sol Hightower, PT Physical Therapist                             Manual Rx (last 36 hours)       Manual Treatments       Row Name 08/31/23 1000 08/31/23 0900          Total Minutes    38275 - PT Manual Therapy Minutes 12  -AC --        Manual Rx 1    Manual Rx 1 Location -- R: infraspinaus, teres rhett/min, middle/lower traps, and proximal lats  -     Manual Rx 1 Type -- STM/IASTM  -AC     Manual Rx 1 Duration -- 12 min  -AC               User Key  (r) = Recorded By, (t) = Taken By, (c) = Cosigned By      Initials Name Provider Type     Sol Hightower, PT Physical Therapist                     PT OP Goals       Row Name 08/31/23 1000          PT Short Term Goals    STG Date to Achieve 07/27/23  -     STG 1 Pt is indpt with HEP.  -     STG 1 Progress Ongoing  -     STG 2 Pt demo's AROM R shoulder abduction to 140 deg.  -     STG 2 Progress Met  -     STG 3 Pt demo's R shoulder ER to 50 deg or better.  -     STG 3 Progress Met  -     STG 4 Pt demo's minimal trigger point tenderness throughout shoulder/scap/cervical musculature.  -     STG 4 Progress Met;Ongoing  -        Long Term Goals    LTG Date to Achieve 08/10/23  -     LTG 1 R shoulder AROM WNLs all planes.  -AC     LTG 1 Progress Partially Met;Ongoing  -     LTG 2 R shoulder MMT 5/5 all palnes.  -     LTG 2 Progress Partially Met;Ongoing  -     LTG 3 Pt able to return to normal workouts with minimal pain increase.  -     LTG 3  Progress Ongoing  -AC     LTG 4 Subjective improvment 80% or better.  -AC     LTG 4 Progress Not Met  -AC        Time Calculation    PT Goal Re-Cert Due Date 09/14/23  -               User Key  (r) = Recorded By, (t) = Taken By, (c) = Cosigned By      Initials Name Provider Type    AC Sol Hightower, PT Physical Therapist                                   Time Calculation:   Start Time: 0930  Stop Time: 1015  Time Calculation (min): 45 min  Timed Charges  90509 - PT Therapeutic Exercise Minutes: 33  59596 - PT Manual Therapy Minutes: 12  Total Minutes  Timed Charges Total Minutes: 45   Total Minutes: 45  Therapy Charges for Today       Code Description Service Date Service Provider Modifiers Qty    12789228684 HC PT THER PROC EA 15 MIN 8/31/2023 Sol Hightower, PT GP 2    [id number] HC PT MANUAL THERAPY EA 15 MIN 8/31/2023 Sol Hightower, PT GP 1                      Sol Hightower, PT  8/31/2023

## 2023-09-07 ENCOUNTER — HOSPITAL ENCOUNTER (OUTPATIENT)
Dept: PHYSICAL THERAPY | Facility: HOSPITAL | Age: 48
Setting detail: THERAPIES SERIES
Discharge: HOME | End: 2023-09-07
Payer: COMMERCIAL

## 2023-09-07 DIAGNOSIS — M25.511 ACUTE PAIN OF RIGHT SHOULDER: Primary | ICD-10-CM

## 2023-09-07 PROCEDURE — 97110 THERAPEUTIC EXERCISES: CPT

## 2023-09-07 NOTE — THERAPY TREATMENT NOTE
Outpatient Physical Therapy Ortho Treatment Note  [facility]     Patient Name: Erich Pompa  : 1975  MRN: [id number]  Today's Date: 2023      Visit Date: 2023    Visit Dx:    ICD-10-CM ICD-9-CM   1. Acute pain of right shoulder  M25.511 719.41       Patient Active Problem List   Diagnosis    Type 2 diabetes mellitus without complication, without long-term current use of insulin    Encounter for screening for malignant neoplasm of colon    Family history of malignant neoplasm of gastrointestinal tract    Family history of malignant neoplasm of colon        Past Medical History:   Diagnosis Date    Diabetes mellitus     Elevated cholesterol     Low back pain         Past Surgical History:   Procedure Laterality Date    COLONOSCOPY      COLONOSCOPY N/A 2023    Procedure: COLONOSCOPY 9:30;  Surgeon: Erich Hermosillo DO;  Location: [facility] ENDOSCOPY;  Service: Gastroenterology;  Laterality: N/A;        PT Ortho       Row Name 23 1000       Subjective Comments    Subjective Comments pt notes doing well, pain only with stretch to reach behind back.  -AC       Precautions and Contraindications    Precautions/Limitations no known precautions/limitations  -AC       Subjective Pain    Able to rate subjective pain? yes  -AC    Pre-Treatment Pain Level 0  -AC              User Key  (r) = Recorded By, (t) = Taken By, (c) = Cosigned By      Initials Name Provider Type    AC Sol Hightower, PT Physical Therapist                                 PT Assessment/Plan       Row Name 23 1000          PT Assessment    Assessment Comments tretemtn tolerated well continued focus on scap/shoulder strength with postural alignment. pt encouraged with slow start back into weight lifting routine with foucs on form and low weight with slow progression as pain allows. vebral undersntaing noted  -AC     Rehab Potential Good  -AC     Patient/caregiver participated in establishment of treatment  plan and goals Yes  -AC     Patient would benefit from skilled therapy intervention Yes  -AC        PT Plan    PT Frequency 1x/week  -AC     Predicted Duration of Therapy Intervention (PT) 2-4 weeks  -AC     PT Plan Comments continue with POC  -AC               User Key  (r) = Recorded By, (t) = Taken By, (c) = Cosigned By      Initials Name Provider Type    AC Sol Hightower, PT Physical Therapist                       OP Exercises       Row Name 09/07/23 1000             Subjective Comments    Subjective Comments pt notes doing well, pain only with stretch to reach behind back.  -AC         Subjective Pain    Able to rate subjective pain? yes  -AC      Pre-Treatment Pain Level 0  -AC         Total Minutes    34413 - PT Therapeutic Exercise Minutes 44  -AC         Exercise 1    Exercise Name 1 Row machine  -AC      Time 1 5 min  -AC         Exercise 2    Exercise Name 2 IR strap stretch  -AC      Sets 2 3  -AC      Time 2 30s  -AC         Exercise 3    Exercise Name 3 modified push-up: scap circles  -AC      Sets 3 1  -AC      Reps 3 20  -AC         Exercise 4    Exercise Name 4 modified push-up at plynth  -AC      Sets 4 2  -AC      Reps 4 10  -AC         Exercise 5    Exercise Name 5 scap circles at wall with 2.5# ball  -AC      Sets 5 2  -AC      Reps 5 30 CW/CCW  -AC         Exercise 6    Exercise Name 6 tball roll outs  -AC      Sets 6 1  -AC      Reps 6 10  -AC         Exercise 7    Exercise Name 7 foam roller snowangles  -AC      Sets 7 1  -AC      Reps 7 10  -AC         Exercise 8    Exercise Name 8 chair tricep push-ups  -AC      Sets 8 2  -AC      Reps 8 10  -AC         Exercise 9    Exercise Name 9 cybex row  -AC      Sets 9 3  -AC      Reps 9 10  -AC      Additional Comments 70#  -AC         Exercise 10    Exercise Name 10 cybex lat pull  -AC      Sets 10 3  -AC      Reps 10 10  -AC      Additional Comments 65#  -AC         Exercise 11    Exercise Name 11 cybex chest press  -AC      Sets 11 2  -AC       Reps 11 10  -      Additional Comments 40#  -                User Key  (r) = Recorded By, (t) = Taken By, (c) = Cosigned By      Initials Name Provider Type    AC Sol Hightower, PT Physical Therapist                                  PT OP Goals       Row Name 09/07/23 1000          PT Short Term Goals    STG Date to Achieve 07/27/23  -     STG 1 Pt is indpt with HEP.  -AC     STG 1 Progress Ongoing  -     STG 2 Pt demo's AROM R shoulder abduction to 140 deg.  -AC     STG 2 Progress Met  -     STG 3 Pt demo's R shoulder ER to 50 deg or better.  -AC     STG 3 Progress Met  -     STG 4 Pt demo's minimal trigger point tenderness throughout shoulder/scap/cervical musculature.  -     STG 4 Progress Met;Ongoing  -        Long Term Goals    LTG Date to Achieve 08/10/23  -     LTG 1 R shoulder AROM WNLs all planes.  -AC     LTG 1 Progress Partially Met;Ongoing  -     LTG 2 R shoulder MMT 5/5 all palnes.  -AC     LTG 2 Progress Partially Met;Ongoing  -     LTG 3 Pt able to return to normal workouts with minimal pain increase.  -AC     LTG 3 Progress Ongoing  -     LTG 4 Subjective improvment 80% or better.  -     LTG 4 Progress Not Met  -        Time Calculation    PT Goal Re-Cert Due Date 09/14/23  -               User Key  (r) = Recorded By, (t) = Taken By, (c) = Cosigned By      Initials Name Provider Type    AC Sol Hightower, PT Physical Therapist                                   Time Calculation:   Start Time: 1015  Stop Time: 1059  Time Calculation (min): 44 min  Timed Charges  03373 - PT Therapeutic Exercise Minutes: 44  Total Minutes  Timed Charges Total Minutes: 44   Total Minutes: 44  Therapy Charges for Today       Code Description Service Date Service Provider Modifiers Qty    33746054781 HC PT THER PROC EA 15 MIN 9/7/2023 Sol Hightower, PT GP 3                      Sol Hightower, PT  9/7/2023

## 2023-09-14 ENCOUNTER — HOSPITAL ENCOUNTER (OUTPATIENT)
Dept: PHYSICAL THERAPY | Facility: HOSPITAL | Age: 48
Setting detail: THERAPIES SERIES
Discharge: HOME | End: 2023-09-14
Payer: COMMERCIAL

## 2023-09-14 DIAGNOSIS — M25.511 ACUTE PAIN OF RIGHT SHOULDER: Primary | ICD-10-CM

## 2023-09-14 PROCEDURE — 97140 MANUAL THERAPY 1/> REGIONS: CPT

## 2023-09-14 PROCEDURE — 97110 THERAPEUTIC EXERCISES: CPT

## 2023-09-14 NOTE — THERAPY TREATMENT NOTE
Outpatient Physical Therapy Ortho Treatment Note  [facility]     Patient Name: Erich Pompa  : 1975  MRN: [id number]  Today's Date: 2023      Visit Date: 2023    Visit Dx:    ICD-10-CM ICD-9-CM   1. Acute pain of right shoulder  M25.511 719.41       Patient Active Problem List   Diagnosis    Type 2 diabetes mellitus without complication, without long-term current use of insulin    Encounter for screening for malignant neoplasm of colon    Family history of malignant neoplasm of gastrointestinal tract    Family history of malignant neoplasm of colon        Past Medical History:   Diagnosis Date    Diabetes mellitus     Elevated cholesterol     Low back pain         Past Surgical History:   Procedure Laterality Date    COLONOSCOPY      COLONOSCOPY N/A 2023    Procedure: COLONOSCOPY 9:30;  Surgeon: Erich Hermosillo DO;  Location: [facility] ENDOSCOPY;  Service: Gastroenterology;  Laterality: N/A;        PT Ortho       Row Name 23 1000       Precautions and Contraindications    Precautions/Limitations no known precautions/limitations  -AC       Subjective Pain    Able to rate subjective pain? yes  -AC    Pre-Treatment Pain Level 0  -AC              User Key  (r) = Recorded By, (t) = Taken By, (c) = Cosigned By      Initials Name Provider Type    AC Sol Hightower, PT Physical Therapist                                 PT Assessment/Plan       Row Name 23 1100          PT Assessment    Assessment Comments treatment tolerated well today with focus on global UE and scap strength. continued occasional VCs for scap alignment with UE strengthening. discussed possible d/c to HEP at re-evalaution next week. will follow-up next week.  -AC     Rehab Potential Good  -AC     Patient/caregiver participated in establishment of treatment plan and goals Yes  -AC     Patient would benefit from skilled therapy intervention Yes  -AC        PT Plan    PT Frequency 1x/week  -AC      Predicted Duration of Therapy Intervention (PT) 2-4 weeks  -AC     PT Plan Comments continue with POC  -AC               User Key  (r) = Recorded By, (t) = Taken By, (c) = Cosigned By      Initials Name Provider Type    Sol Whiting PT Physical Therapist                       OP Exercises       Row Name 09/14/23 1100 09/14/23 1000          Subjective    Subjective Comments -- Pt notes having some increased delt pain over the past week following playing volleyball with daughter and having one pass that pulled on his shoulder. notes that with stretching he was able to decrease pain over 2-3 days back to no pain. has not yet started back to workouts.  -AC        Subjective Pain    Able to rate subjective pain? -- yes  -AC     Pre-Treatment Pain Level -- 0  -AC        Total Minutes    93210 - PT Therapeutic Exercise Minutes 36  -AC --     54923 - PT Manual Therapy Minutes 8  -AC --        Exercise 1    Exercise Name 1 -- Row machine  -AC     Time 1 -- 5 min  -AC        Exercise 2    Exercise Name 2 -- see manual  -AC     Time 2 -- 8 min  -AC        Exercise 3    Exercise Name 3 -- fdw/lat delt t-raise  -AC     Sets 3 -- 2  -AC     Reps 3 -- 10  -AC     Additional Comments -- 3# DB  -AC        Exercise 4    Exercise Name 4 -- around the world- pecs  -AC     Sets 4 -- 2  -AC     Reps 4 -- 10  -AC     Additional Comments -- 3# DB  -AC        Exercise 5    Exercise Name 5 -- bent over reverse fly  -AC     Sets 5 -- 2  -AC     Reps 5 -- 10  -AC     Additional Comments -- 3# DB, VCs for scap alignment  -AC        Exercise 6    Exercise Name 6 -- D1/D2 UE PNF patterns with RTB  -AC     Sets 6 -- 2  -AC     Reps 6 -- 10  -AC     Additional Comments -- RTB- RUE only  -AC        Exercise 7    Exercise Name 7 -- cybex chest press  -AC     Additional Comments -- 40#  -AC               User Key  (r) = Recorded By, (t) = Taken By, (c) = Cosigned By      Initials Name Provider Type    Sol Whiting PT Physical  Therapist                             Manual Rx (last 36 hours)       Manual Treatments       Row Name 09/14/23 1100             Total Minutes    26610 - PT Manual Therapy Minutes 8  -AC         Manual Rx 1    Manual Rx 1 Location R: deltoids  -AC      Manual Rx 1 Type STM/TPR  -AC      Manual Rx 1 Duration 8 min  -                User Key  (r) = Recorded By, (t) = Taken By, (c) = Cosigned By      Initials Name Provider Type    Sol Whiting, PT Physical Therapist                     PT OP Goals       Row Name 09/14/23 1100          PT Short Term Goals    STG Date to Achieve 07/27/23  -     STG 1 Pt is indpt with HEP.  -AC     STG 1 Progress Ongoing  -AC     STG 2 Pt demo's AROM R shoulder abduction to 140 deg.  -AC     STG 2 Progress Met  -AC     STG 3 Pt demo's R shoulder ER to 50 deg or better.  -AC     STG 3 Progress Met  -AC     STG 4 Pt demo's minimal trigger point tenderness throughout shoulder/scap/cervical musculature.  -AC     STG 4 Progress Met;Ongoing  -        Long Term Goals    LTG Date to Achieve 08/10/23  -     LTG 1 R shoulder AROM WNLs all planes.  -AC     LTG 1 Progress Partially Met;Ongoing  -AC     LTG 2 R shoulder MMT 5/5 all palnes.  -AC     LTG 2 Progress Partially Met;Ongoing  -AC     LTG 3 Pt able to return to normal workouts with minimal pain increase.  -AC     LTG 3 Progress Ongoing  -AC     LTG 4 Subjective improvment 80% or better.  -AC     LTG 4 Progress Not Met  -        Time Calculation    PT Goal Re-Cert Due Date 09/14/23  -               User Key  (r) = Recorded By, (t) = Taken By, (c) = Cosigned By      Initials Name Provider Type    Sol Whiting, PT Physical Therapist                                   Time Calculation:   Start Time: 1014  Stop Time: 1058  Time Calculation (min): 44 min  Timed Charges  63155 - PT Therapeutic Exercise Minutes: 36  19093 - PT Manual Therapy Minutes: 8  Total Minutes  Timed Charges Total Minutes: 44   Total Minutes:  44  Therapy Charges for Today       Code Description Service Date Service Provider Modifiers Qty    [id number]  PT THER PROC EA 15 MIN 9/14/2023 Sol Hightower, PT GP 2    [id number]  PT MANUAL THERAPY EA 15 MIN 9/14/2023 Sol Hightower, PT GP 1                      Sol Hightower, PT  9/14/2023

## 2023-09-21 ENCOUNTER — HOSPITAL ENCOUNTER (OUTPATIENT)
Dept: PHYSICAL THERAPY | Facility: HOSPITAL | Age: 48
Setting detail: THERAPIES SERIES
Discharge: HOME | End: 2023-09-21
Payer: COMMERCIAL

## 2023-09-21 DIAGNOSIS — M25.511 ACUTE PAIN OF RIGHT SHOULDER: Primary | ICD-10-CM

## 2023-09-21 PROCEDURE — 97110 THERAPEUTIC EXERCISES: CPT

## 2023-09-21 NOTE — THERAPY DISCHARGE NOTE
Outpatient Physical Therapy Ortho Progress Note/Discharge Summary  [facility]     Patient Name: Erich Pompa  : 1975  MRN: [id number]  Today's Date: 2023      Visit Date: 2023    Visit Dx:    ICD-10-CM ICD-9-CM   1. Acute pain of right shoulder  M25.511 719.41       Patient Active Problem List   Diagnosis    Type 2 diabetes mellitus without complication, without long-term current use of insulin    Encounter for screening for malignant neoplasm of colon    Family history of malignant neoplasm of gastrointestinal tract    Family history of malignant neoplasm of colon        Past Medical History:   Diagnosis Date    Diabetes mellitus     Elevated cholesterol     Low back pain         Past Surgical History:   Procedure Laterality Date    COLONOSCOPY      COLONOSCOPY N/A 2023    Procedure: COLONOSCOPY 9:30;  Surgeon: Erich Hermosillo DO;  Location: [facility] ENDOSCOPY;  Service: Gastroenterology;  Laterality: N/A;        PT Ortho       Row Name 23 0900       Subjective    Subjective Comments Pt notes doing well today, pain only with stretching this pas t week. puga feel shoulder is doing much better, comfortable with HEP to continue to progress. went to the gym- sore but no pain. subjective improvment close to 100%  -AC       Precautions and Contraindications    Precautions/Limitations no known precautions/limitations  -AC       Subjective Pain    Able to rate subjective pain? yes  -AC    Pre-Treatment Pain Level 0  -AC              User Key  (r) = Recorded By, (t) = Taken By, (c) = Cosigned By      Initials Name Provider Type    AC Sol Hightower, PT Physical Therapist                                 PT Assessment/Plan       Row Name 23 1000          PT Assessment    Assessment Comments Pt with all goals except ROM/MMT which demo signficiant impormvnet however continued limited comapred to L side. He demo's excellent HEP complaince and good understanding of symptom  managment and will continue to progress with HEP. pt undersntanding and agreeebal eto HEP today.  -AC        PT Plan    PT Plan Comments d/c  -AC               User Key  (r) = Recorded By, (t) = Taken By, (c) = Cosigned By      Initials Name Provider Type    AC Sol Hightower, PT Physical Therapist                         OP Exercises       Row Name 09/21/23 1022 09/21/23 0900          Subjective    Subjective Comments -- Pt notes doing well today, pain only with stretching this pas t week. puga feel shoulder is doing much better, comfortable with HEP to continue to progress. went to the gym- sore but no pain. subjective improvment close to 100%  -AC        Subjective Pain    Able to rate subjective pain? -- yes  -AC     Pre-Treatment Pain Level -- 0  -AC        Total Minutes    48060 - PT Therapeutic Exercise Minutes 41  -AC --        Exercise 1    Exercise Name 1 -- Row machine  -AC     Time 1 -- 6 min  -AC        Exercise 2    Exercise Name 2 -- tball roll outs  -AC     Sets 2 -- 1  -AC     Reps 2 -- 10  -AC        Exercise 3    Exercise Name 3 -- tball wall push-ups  -AC     Sets 3 -- 2  -AC     Reps 3 -- 10  -AC        Exercise 4    Exercise Name 4 -- tband 90/90 ER  -AC     Sets 4 -- 2  -AC     Reps 4 -- 10  -AC     Additional Comments -- YTB  -AC        Exercise 5    Exercise Name 5 -- tband 90/90 IR  -AC     Sets 5 -- 2  -AC     Reps 5 -- 10  -AC        Exercise 6    Exercise Name 6 -- prone shoulder flex from mat  -AC     Sets 6 -- 2  -AC     Reps 6 -- 10  -AC        Exercise 7    Exercise Name 7 -- plank shoulder taps  -AC     Sets 7 -- 1  -AC     Reps 7 -- 10  -AC        Exercise 8    Exercise Name 8 -- tband scap clocks at wall  -AC     Sets 8 -- 1  -AC     Reps 8 -- 10  -AC     Additional Comments -- RTB- R only  -AC        Exercise 9    Exercise Name 9 -- open-book IR/ER  -AC     Sets 9 -- 10x each side  -AC        Exercise 10    Exercise Name 10 -- tband no- money  -AC     Sets 10 -- 2  -AC      Reps 10 -- 10  -     Additional Comments -- RTB  -        Exercise 11    Exercise Name 11 -- scap ball circles  -     Sets 11 -- 30 cw/ccw  -     Additional Comments -- 2.5# ball  -        Exercise 12    Exercise Name 12 -- overhead ball toss on wall  -     Reps 12 -- 30  -               User Key  (r) = Recorded By, (t) = Taken By, (c) = Cosigned By      Initials Name Provider Type    Sol Whiting, PT Physical Therapist                                    PT OP Goals       Row Name 09/21/23 1000          PT Short Term Goals    STG Date to Achieve 07/27/23  -     STG 1 Pt is indpt with HEP.  -     STG 1 Progress Met  -     STG 2 Pt demo's AROM R shoulder abduction to 140 deg.  -     STG 2 Progress Met  -     STG 3 Pt demo's R shoulder ER to 50 deg or better.  -     STG 3 Progress Met  -     STG 4 Pt demo's minimal trigger point tenderness throughout shoulder/scap/cervical musculature.  -     STG 4 Progress Met  -        Long Term Goals    LTG Date to Achieve 08/10/23  -     LTG 1 R shoulder AROM WNLs all planes.  -     LTG 1 Progress Partially Met;Ongoing  -     LTG 2 R shoulder MMT 5/5 all palnes.  -     LTG 2 Progress Partially Met;Ongoing  -     LTG 3 Pt able to return to normal workouts with minimal pain increase.  -     LTG 3 Progress Met  -     LTG 4 Subjective improvment 80% or better.  -     LTG 4 Progress Not Met  -               User Key  (r) = Recorded By, (t) = Taken By, (c) = Cosigned By      Initials Name Provider Type    Sol Whiting, PT Physical Therapist                                   Time Calculation:   Start Time: 0930  Stop Time: 1011  Time Calculation (min): 41 min  Timed Charges  03280 - PT Therapeutic Exercise Minutes: 41  Total Minutes  Timed Charges Total Minutes: 41   Total Minutes: 41  Therapy Charges for Today       Code Description Service Date Service Provider Modifiers Qty    [id number]  PT THER PROC EA 15 MIN  9/21/2023 Sol Hightower, PT GP 3                  OP PT Discharge Summary  Date of Discharge: 09/21/23  Reason for Discharge: All goals achieved, Independent  Outcomes Achieved: Able to achieve all goals within established timeline, Patient able to partially acheive established goals  Discharge Destination: Home with home program      Sol Hightower, PT  9/21/2023

## 2023-09-28 ENCOUNTER — APPOINTMENT (OUTPATIENT)
Dept: PHYSICAL THERAPY | Facility: HOSPITAL | Age: 48
End: 2023-09-28
Payer: COMMERCIAL

## (undated) DEVICE — CANN SMPL SOFTECH BIFLO ETCO2 A/M 7FT